# Patient Record
Sex: MALE | Race: WHITE | NOT HISPANIC OR LATINO | RURAL
[De-identification: names, ages, dates, MRNs, and addresses within clinical notes are randomized per-mention and may not be internally consistent; named-entity substitution may affect disease eponyms.]

---

## 2023-04-19 ENCOUNTER — OFFICE VISIT (OUTPATIENT)
Dept: FAMILY MEDICINE | Facility: CLINIC | Age: 68
End: 2023-04-19
Payer: COMMERCIAL

## 2023-04-19 VITALS
RESPIRATION RATE: 17 BRPM | TEMPERATURE: 98 F | HEIGHT: 69 IN | DIASTOLIC BLOOD PRESSURE: 85 MMHG | SYSTOLIC BLOOD PRESSURE: 122 MMHG | OXYGEN SATURATION: 96 % | HEART RATE: 81 BPM

## 2023-04-19 DIAGNOSIS — F17.219 CIGARETTE NICOTINE DEPENDENCE WITH NICOTINE-INDUCED DISORDER: Chronic | ICD-10-CM

## 2023-04-19 DIAGNOSIS — L03.116 CELLULITIS OF LEFT LOWER EXTREMITY: Primary | ICD-10-CM

## 2023-04-19 DIAGNOSIS — M79.89 LEFT LEG SWELLING: ICD-10-CM

## 2023-04-19 DIAGNOSIS — Z13.9 SCREENING DUE: ICD-10-CM

## 2023-04-19 DIAGNOSIS — Z13.220 ENCOUNTER FOR SCREENING FOR LIPID DISORDER: ICD-10-CM

## 2023-04-19 DIAGNOSIS — H93.13 TINNITUS OF BOTH EARS: ICD-10-CM

## 2023-04-19 DIAGNOSIS — I82.402 ACUTE DEEP VEIN THROMBOSIS (DVT) OF LEFT LOWER EXTREMITY, UNSPECIFIED VEIN: ICD-10-CM

## 2023-04-19 DIAGNOSIS — Z11.59 NEED FOR HEPATITIS C SCREENING TEST: ICD-10-CM

## 2023-04-19 PROBLEM — F17.210 DEPENDENCE ON NICOTINE FROM CIGARETTES: Chronic | Status: ACTIVE | Noted: 2023-04-19

## 2023-04-19 PROBLEM — F17.210 DEPENDENCE ON NICOTINE FROM CIGARETTES: Chronic | Status: RESOLVED | Noted: 2023-04-19 | Resolved: 2023-04-19

## 2023-04-19 PROCEDURE — 1126F PR PAIN SEVERITY QUANTIFIED, NO PAIN PRESENT: ICD-10-PCS | Mod: ,,, | Performed by: FAMILY MEDICINE

## 2023-04-19 PROCEDURE — 99203 OFFICE O/P NEW LOW 30 MIN: CPT | Mod: ,,, | Performed by: FAMILY MEDICINE

## 2023-04-19 PROCEDURE — 3074F SYST BP LT 130 MM HG: CPT | Mod: ,,, | Performed by: FAMILY MEDICINE

## 2023-04-19 PROCEDURE — 1101F PT FALLS ASSESS-DOCD LE1/YR: CPT | Mod: ,,, | Performed by: FAMILY MEDICINE

## 2023-04-19 PROCEDURE — 1101F PR PT FALLS ASSESS DOC 0-1 FALLS W/OUT INJ PAST YR: ICD-10-PCS | Mod: ,,, | Performed by: FAMILY MEDICINE

## 2023-04-19 PROCEDURE — 3079F DIAST BP 80-89 MM HG: CPT | Mod: ,,, | Performed by: FAMILY MEDICINE

## 2023-04-19 PROCEDURE — 3074F PR MOST RECENT SYSTOLIC BLOOD PRESSURE < 130 MM HG: ICD-10-PCS | Mod: ,,, | Performed by: FAMILY MEDICINE

## 2023-04-19 PROCEDURE — 3288F FALL RISK ASSESSMENT DOCD: CPT | Mod: ,,, | Performed by: FAMILY MEDICINE

## 2023-04-19 PROCEDURE — 1160F RVW MEDS BY RX/DR IN RCRD: CPT | Mod: ,,, | Performed by: FAMILY MEDICINE

## 2023-04-19 PROCEDURE — 1126F AMNT PAIN NOTED NONE PRSNT: CPT | Mod: ,,, | Performed by: FAMILY MEDICINE

## 2023-04-19 PROCEDURE — 1159F MED LIST DOCD IN RCRD: CPT | Mod: ,,, | Performed by: FAMILY MEDICINE

## 2023-04-19 PROCEDURE — 99203 PR OFFICE/OUTPT VISIT, NEW, LEVL III, 30-44 MIN: ICD-10-PCS | Mod: ,,, | Performed by: FAMILY MEDICINE

## 2023-04-19 PROCEDURE — 1160F PR REVIEW ALL MEDS BY PRESCRIBER/CLIN PHARMACIST DOCUMENTED: ICD-10-PCS | Mod: ,,, | Performed by: FAMILY MEDICINE

## 2023-04-19 PROCEDURE — 3079F PR MOST RECENT DIASTOLIC BLOOD PRESSURE 80-89 MM HG: ICD-10-PCS | Mod: ,,, | Performed by: FAMILY MEDICINE

## 2023-04-19 PROCEDURE — 1159F PR MEDICATION LIST DOCUMENTED IN MEDICAL RECORD: ICD-10-PCS | Mod: ,,, | Performed by: FAMILY MEDICINE

## 2023-04-19 PROCEDURE — 3288F PR FALLS RISK ASSESSMENT DOCUMENTED: ICD-10-PCS | Mod: ,,, | Performed by: FAMILY MEDICINE

## 2023-04-19 RX ORDER — SULFAMETHOXAZOLE AND TRIMETHOPRIM 800; 160 MG/1; MG/1
2 TABLET ORAL 2 TIMES DAILY
Qty: 28 TABLET | Refills: 0 | Status: SHIPPED | OUTPATIENT
Start: 2023-04-19 | End: 2023-04-26

## 2023-04-19 RX ORDER — TRIAMCINOLONE ACETONIDE 1 MG/G
CREAM TOPICAL 2 TIMES DAILY
Qty: 28.4 G | Refills: 1 | Status: SHIPPED | OUTPATIENT
Start: 2023-04-19 | End: 2023-08-10 | Stop reason: SDUPTHER

## 2023-04-19 NOTE — ASSESSMENT & PLAN NOTE
See media, photographs (4)  Bactrim double strength 2 tabs PO BID for 7 days  Kenalog 0.1% topical BID until symptoms subside (2-4 weeks), avoid open wounds.  Claritin OTC for itching PRN  RTC if symptoms are not getting better within 3-5 days.

## 2023-04-19 NOTE — ASSESSMENT & PLAN NOTE
Chronic for 20-30 years from previous work with high noises doing [employer]. No hearing loss.  Encouraged patient to research and get noise generator for tinnitus

## 2023-04-19 NOTE — PROGRESS NOTES
Subjective:       Patient ID: Jerome Miranda is a 67 y.o. male.    Chief Complaint: Rash (Per 2+ weeks ) and Leg Swelling (Left leg )    See update at bottom of note on 5/5/23    Patient is a 68yo  male with a history of 0.5 ppd for 30 years cigarette smoking who presents to [facility] for left lower leg rash and swelling. Patient last saw a provider about 40 years ago. About 4 weeks ago, patient's left lower leg started to become swollen. 2 weeks ago, patient started scratching his left ankle due to itches and later redness, swelling, scales developed and gradually got worse. Patient tried putting on vitamin E oil and lotion without relief. Patient endorses mild bleeding, blisters, and clear fluid drainage but denies pus draining. Patient denies known drug allergies or any other medical history including MI, PE, DVT, cancer, COPD, DM, or liver and kidney problems. See media for LLE photographs and A and P for more details.        Current Outpatient Medications:     sulfamethoxazole-trimethoprim 800-160mg (BACTRIM DS) 800-160 mg Tab, Take 2 tablets by mouth 2 (two) times daily. for 7 days, Disp: 28 tablet, Rfl: 0    triamcinolone acetonide 0.1% (KENALOG) 0.1 % cream, Apply topically 2 (two) times daily. Apply twice daily until symptoms subside (2-4 weeks), avoid open wounds., Disp: 28.4 g, Rfl: 1    Review of patient's allergies indicates:  No Known Allergies    History reviewed. No pertinent past medical history.    History reviewed. No pertinent surgical history.    History reviewed. No pertinent family history.    Social History     Tobacco Use    Smoking status: Every Day     Packs/day: 0.50     Years: 40.00     Pack years: 20.00     Types: Cigarettes    Smokeless tobacco: Never   Substance Use Topics    Alcohol use: Yes     Comment: 6/DRINKS    Drug use: Not Currently     Types: Marijuana       Review of Systems   Constitutional:  Negative for appetite change, chills, diaphoresis and fever.   HENT:  Negative  "for trouble swallowing.    Eyes:  Negative for visual disturbance.   Respiratory:  Negative for cough and shortness of breath.    Cardiovascular:  Positive for leg swelling (left with itch, mild bleed, drainage of clear fluid). Negative for chest pain.   Gastrointestinal:  Negative for abdominal pain, blood in stool, nausea and vomiting.   Genitourinary:  Negative for difficulty urinating, dysuria and hematuria.   Musculoskeletal:  Negative for leg pain.   Integumentary:  Positive for wound (left ankle) and mole/lesion (left ankle).   Neurological:  Negative for numbness.   Psychiatric/Behavioral:  Negative for sleep disturbance.        Current Medications:   Medication List with Changes/Refills   New Medications    SULFAMETHOXAZOLE-TRIMETHOPRIM 800-160MG (BACTRIM DS) 800-160 MG TAB    Take 2 tablets by mouth 2 (two) times daily. for 7 days       Start Date: 4/19/2023 End Date: 4/26/2023    TRIAMCINOLONE ACETONIDE 0.1% (KENALOG) 0.1 % CREAM    Apply topically 2 (two) times daily. Apply twice daily until symptoms subside (2-4 weeks), avoid open wounds.       Start Date: 4/19/2023 End Date: 5/19/2023            Objective:        Vitals:    04/19/23 1339   BP: 122/85   Pulse: 81   Resp: 17   Temp: 97.5 °F (36.4 °C)   TempSrc: Temporal   SpO2: 96%   Height: 5' 9" (1.753 m)       Physical Exam  Vitals and nursing note reviewed.   Constitutional:       General: He is not in acute distress.     Appearance: He is not ill-appearing, toxic-appearing or diaphoretic.   HENT:      Head: Normocephalic and atraumatic.      Right Ear: External ear normal.      Left Ear: External ear normal.      Mouth/Throat:      Pharynx: Oropharynx is clear.   Eyes:      General: No scleral icterus.        Right eye: No discharge.         Left eye: No discharge.      Extraocular Movements: Extraocular movements intact.      Pupils: Pupils are equal, round, and reactive to light.   Cardiovascular:      Rate and Rhythm: Normal rate and regular " rhythm.      Pulses: Normal pulses.      Heart sounds: Normal heart sounds. No murmur heard.  Pulmonary:      Effort: Pulmonary effort is normal. No respiratory distress.   Abdominal:      General: There is no distension.   Musculoskeletal:         General: Swelling present. No tenderness or deformity.      Cervical back: Neck supple.      Right lower leg: No edema.      Left lower leg: Edema (see media, 4 photographs) present.   Skin:     General: Skin is warm and dry.      Coloration: Skin is not jaundiced.      Findings: Erythema, lesion (left LE, see media, photographs) and rash present.   Neurological:      Mental Status: He is alert.      Sensory: No sensory deficit.      Gait: Gait normal.   Psychiatric:         Behavior: Behavior normal.             No results found for: WBC, HGB, HCT, PLT, CHOL, TRIG, HDL, LDLDIRECT, ALT, AST, NA, K, CL, CREATININE, BUN, CO2, TSH, PSA, INR, GLUF, HGBA1C, MICROALBUR   Assessment:       1. Cellulitis of left lower extremity    2. Encounter for screening for lipid disorder    3. Need for hepatitis C screening test    4. Acute deep vein thrombosis (DVT) of left lower extremity, unspecified vein    5. Cigarette nicotine dependence with nicotine-induced disorder    6. Left leg swelling    7. Screening due    8. Tinnitus of both ears        Plan:         Problem List Items Addressed This Visit          ENT    Tinnitus of both ears     Chronic for 20-30 years from previous work with high noises doing [employer]. No hearing loss.  Encouraged patient to research and get noise generator for tinnitus                ID    Cellulitis of left lower extremity - Primary     See media, photographs (4)  Bactrim double strength 2 tabs PO BID for 7 days  Kenalog 0.1% topical BID until symptoms subside (2-4 weeks), avoid open wounds.  Claritin OTC for itching PRN  RTC if symptoms are not getting better within 3-5 days.             Relevant Medications    sulfamethoxazole-trimethoprim 800-160mg  (BACTRIM DS) 800-160 mg Tab    triamcinolone acetonide 0.1% (KENALOG) 0.1 % cream       Hematology    RESOLVED: Acute deep vein thrombosis (DVT) of left lower extremity    Relevant Orders    US Lower Extremity Veins Left       Orthopedic    Left leg swelling     See media, photograph  Chronic 0.5ppd smoker for at least 30 years  Well's criteria for DVT = 0 (low risk)  Well's criteria for PE = 3 (moderate risk: 16.2% chance PE in an ED population)  US left LE to rule out DVT              Palliative Care    Screening due     Patient refused Hep C screen, lipid panel, COVID vaccine, Pneumococcal vac, Tetanus, colonoscopy, LDCT lung screen, shingles vac, AAA screen.  Consider addressing on next visit              Other    RESOLVED: Dependence on nicotine from cigarettes (Chronic)     Other Visit Diagnoses       Encounter for screening for lipid disorder        Need for hepatitis C screening test               Update on 5/5/23  I saw patient 2 weeks ago on 4/19/23 in clinic for his LLE cellulitis. Sent him home with Bactrim for 7 days and referred to ultrasound to rule out DVT since Well's score DVT was 0. Received a call from [employer] today saying patient has a left femoral vein DVT. I haven't seen patient in 2 weeks so I told [employer] to instruct patient to go to [facility] for DVT workup. I spoke with ED attending Dr. García about patient and he's aware. I called patient's cell phone but unavailable. Called his home phone and spoke with his son about it, and son said patient is at [facility].    Follow up if symptoms worsen or fail to improve.    Rolf Danielle DO     Instructed patient that if symptoms fail to improve or worsen patient should seek immediate medical attention or report to the nearest emergency department. Patient expressed verbal agreement and understanding to this plan of care.

## 2023-04-19 NOTE — ASSESSMENT & PLAN NOTE
See media, photograph  Chronic 0.5ppd smoker for at least 30 years  Well's criteria for DVT = 0 (low risk)  Well's criteria for PE = 3 (moderate risk: 16.2% chance PE in an ED population)  US left LE to rule out DVT

## 2023-04-19 NOTE — ASSESSMENT & PLAN NOTE
Patient refused Hep C screen, lipid panel, COVID vaccine, Pneumococcal vac, Tetanus, colonoscopy, LDCT lung screen, shingles vac, AAA screen.  Consider addressing on next visit

## 2023-05-05 ENCOUNTER — HOSPITAL ENCOUNTER (EMERGENCY)
Facility: HOSPITAL | Age: 68
Discharge: HOME | End: 2023-05-05
Payer: COMMERCIAL

## 2023-05-05 ENCOUNTER — HOSPITAL ENCOUNTER (OUTPATIENT)
Dept: RADIOLOGY | Facility: HOSPITAL | Age: 68
Discharge: HOME | End: 2023-05-05
Payer: COMMERCIAL

## 2023-05-05 VITALS
WEIGHT: 215 LBS | SYSTOLIC BLOOD PRESSURE: 119 MMHG | HEART RATE: 91 BPM | BODY MASS INDEX: 31.84 KG/M2 | OXYGEN SATURATION: 95 % | RESPIRATION RATE: 18 BRPM | DIASTOLIC BLOOD PRESSURE: 76 MMHG | TEMPERATURE: 98 F | HEIGHT: 69 IN

## 2023-05-05 DIAGNOSIS — I82.412 ACUTE DEEP VEIN THROMBOSIS (DVT) OF FEMORAL VEIN OF LEFT LOWER EXTREMITY: Primary | ICD-10-CM

## 2023-05-05 DIAGNOSIS — I82.402 ACUTE DEEP VEIN THROMBOSIS (DVT) OF LEFT LOWER EXTREMITY, UNSPECIFIED VEIN: ICD-10-CM

## 2023-05-05 LAB
ALBUMIN SERPL BCP-MCNC: 3.5 G/DL (ref 3.5–5)
ALBUMIN/GLOB SERPL: 0.8 {RATIO}
ALP SERPL-CCNC: 76 U/L (ref 45–115)
ALT SERPL W P-5'-P-CCNC: 42 U/L (ref 16–61)
ANION GAP SERPL CALCULATED.3IONS-SCNC: 15 MMOL/L (ref 7–16)
APTT PPP: 28.1 SECONDS (ref 25.2–37.3)
AST SERPL W P-5'-P-CCNC: 22 U/L (ref 15–37)
BASOPHILS # BLD AUTO: 0.07 K/UL (ref 0–0.2)
BASOPHILS NFR BLD AUTO: 0.8 % (ref 0–1)
BILIRUB SERPL-MCNC: 0.3 MG/DL (ref ?–1.2)
BUN SERPL-MCNC: 29 MG/DL (ref 7–18)
BUN/CREAT SERPL: 24 (ref 6–20)
CALCIUM SERPL-MCNC: 9.1 MG/DL (ref 8.5–10.1)
CHLORIDE SERPL-SCNC: 107 MMOL/L (ref 98–107)
CO2 SERPL-SCNC: 25 MMOL/L (ref 21–32)
CREAT SERPL-MCNC: 1.22 MG/DL (ref 0.7–1.3)
DIFFERENTIAL METHOD BLD: ABNORMAL
EGFR (NO RACE VARIABLE) (RUSH/TITUS): 65 ML/MIN/1.73M2
EOSINOPHIL # BLD AUTO: 0.41 K/UL (ref 0–0.5)
EOSINOPHIL NFR BLD AUTO: 4.7 % (ref 1–4)
ERYTHROCYTE [DISTWIDTH] IN BLOOD BY AUTOMATED COUNT: 14.5 % (ref 11.5–14.5)
GLOBULIN SER-MCNC: 4.4 G/DL (ref 2–4)
GLUCOSE SERPL-MCNC: 94 MG/DL (ref 74–106)
HCT VFR BLD AUTO: 49 % (ref 40–54)
HGB BLD-MCNC: 16.7 G/DL (ref 13.5–18)
IMM GRANULOCYTES # BLD AUTO: 0.03 K/UL (ref 0–0.04)
IMM GRANULOCYTES NFR BLD: 0.3 % (ref 0–0.4)
INR BLD: 1.07
LYMPHOCYTES # BLD AUTO: 2.28 K/UL (ref 1–4.8)
LYMPHOCYTES NFR BLD AUTO: 26.2 % (ref 27–41)
MCH RBC QN AUTO: 33.5 PG (ref 27–31)
MCHC RBC AUTO-ENTMCNC: 34.1 G/DL (ref 32–36)
MCV RBC AUTO: 98.2 FL (ref 80–96)
MONOCYTES # BLD AUTO: 0.8 K/UL (ref 0–0.8)
MONOCYTES NFR BLD AUTO: 9.2 % (ref 2–6)
MPC BLD CALC-MCNC: 8.9 FL (ref 9.4–12.4)
NEUTROPHILS # BLD AUTO: 5.12 K/UL (ref 1.8–7.7)
NEUTROPHILS NFR BLD AUTO: 58.8 % (ref 53–65)
NRBC # BLD AUTO: 0 X10E3/UL
NRBC, AUTO (.00): 0 %
PLATELET # BLD AUTO: 240 K/UL (ref 150–400)
POTASSIUM SERPL-SCNC: 4.4 MMOL/L (ref 3.5–5.1)
PROT SERPL-MCNC: 7.9 G/DL (ref 6.4–8.2)
PROTHROMBIN TIME: 13.5 SECONDS (ref 11.7–14.7)
RBC # BLD AUTO: 4.99 M/UL (ref 4.6–6.2)
SODIUM SERPL-SCNC: 143 MMOL/L (ref 136–145)
WBC # BLD AUTO: 8.71 K/UL (ref 4.5–11)

## 2023-05-05 PROCEDURE — 99284 PR EMERGENCY DEPT VISIT,LEVEL IV: ICD-10-PCS | Mod: ,,, | Performed by: NURSE PRACTITIONER

## 2023-05-05 PROCEDURE — 99283 EMERGENCY DEPT VISIT LOW MDM: CPT | Mod: 25

## 2023-05-05 PROCEDURE — 25000003 PHARM REV CODE 250: Performed by: NURSE PRACTITIONER

## 2023-05-05 PROCEDURE — 85025 COMPLETE CBC W/AUTO DIFF WBC: CPT | Performed by: NURSE PRACTITIONER

## 2023-05-05 PROCEDURE — 80053 COMPREHEN METABOLIC PANEL: CPT | Performed by: NURSE PRACTITIONER

## 2023-05-05 PROCEDURE — 93970 EXTREMITY STUDY: CPT | Mod: TC

## 2023-05-05 PROCEDURE — 99284 EMERGENCY DEPT VISIT MOD MDM: CPT | Mod: ,,, | Performed by: NURSE PRACTITIONER

## 2023-05-05 PROCEDURE — 85610 PROTHROMBIN TIME: CPT | Performed by: NURSE PRACTITIONER

## 2023-05-05 PROCEDURE — 93970 US LOWER EXTREMITY VEINS BILATERAL: ICD-10-PCS | Mod: 26,,, | Performed by: RADIOLOGY

## 2023-05-05 PROCEDURE — 93970 EXTREMITY STUDY: CPT | Mod: 26,,, | Performed by: RADIOLOGY

## 2023-05-05 PROCEDURE — 85730 THROMBOPLASTIN TIME PARTIAL: CPT | Performed by: NURSE PRACTITIONER

## 2023-05-05 RX ADMIN — APIXABAN 10 MG: 5 TABLET, FILM COATED ORAL at 02:05

## 2023-05-05 NOTE — ED NOTES
Pt requested more information on what was going to happen after given Eliquis. Nurse went and asked NP and NP said once the CMP resulted that she would discharge him as long as everything looked normal. Pt informed of NP statement and pt told nurse he was tired of being cooped up in a hole and needed to know where restroom was. Pt was shown restroom.

## 2023-05-05 NOTE — ED PROVIDER NOTES
Encounter Date: 5/5/2023       History     Chief Complaint   Patient presents with    Leg Pain     67-year-old male presents to ED with complaint of leg pain.  Patient states he has had issues with his left leg for approximately 1 month.  Patient endorses use of antibiotics for treatment of leg.  Patient states he went to PCP for left foot issue and told PCP he was having increased swelling to his left leg.  Patient was sent for ultrasound of left lower extremity and was noted to have DVT to left leg.  Patient sent to ED for further evaluation.  Patient denies chest pain, shortness of breath, weakness, dizziness, or gait disturbance.    The history is provided by the patient. No  was used.   Leg Pain   There was no injury mechanism. The incident occurred several weeks ago. The pain is present in the left leg. The pain is at a severity of 0/10. Pertinent negatives include no numbness, no inability to bear weight, no muscle weakness, no loss of sensation and no tingling. He reports no foreign bodies present. Nothing aggravates the symptoms. He has tried nothing for the symptoms. The treatment provided no relief.   Review of patient's allergies indicates:  No Known Allergies  History reviewed. No pertinent past medical history.  History reviewed. No pertinent surgical history.  History reviewed. No pertinent family history.  Social History     Tobacco Use    Smoking status: Every Day     Packs/day: 0.50     Years: 40.00     Pack years: 20.00     Types: Cigarettes    Smokeless tobacco: Never   Substance Use Topics    Alcohol use: Yes     Comment: 6/DRINKS    Drug use: Not Currently     Types: Marijuana     Review of Systems   Constitutional:  Negative for chills and fever.   HENT:  Negative for congestion, sinus pressure and sinus pain.    Eyes:  Negative for photophobia and visual disturbance.   Respiratory:  Negative for cough and shortness of breath.    Genitourinary:  Negative for dysuria and  urgency.   Musculoskeletal:  Negative for arthralgias and gait problem.   Skin:  Positive for color change. Negative for wound.   Neurological:  Negative for dizziness, tingling and numbness.   All other systems reviewed and are negative.    Physical Exam     Initial Vitals [05/05/23 1210]   BP Pulse Resp Temp SpO2   119/76 91 18 98.1 °F (36.7 °C) 95 %      MAP       --         Physical Exam    Nursing note and vitals reviewed.  Constitutional: He appears well-developed and well-nourished.   HENT:   Head: Normocephalic and atraumatic.   Eyes: EOM are normal. Pupils are equal, round, and reactive to light.   Neck: Neck supple.   Normal range of motion.  Cardiovascular:  Normal rate and regular rhythm.           No murmur heard.  Pulmonary/Chest: He has no wheezes. He has no rhonchi.   Abdominal: Abdomen is soft. He exhibits no distension. There is no abdominal tenderness.   Musculoskeletal:         General: Edema present. No tenderness.      Cervical back: Normal range of motion and neck supple.      Left lower leg: Swelling present. No tenderness.        Legs:      Lymphadenopathy:     He has no cervical adenopathy.   Neurological: He is alert and oriented to person, place, and time. No cranial nerve deficit or sensory deficit.   Skin: Skin is warm and dry. Capillary refill takes less than 2 seconds.   Psychiatric: He has a normal mood and affect. Thought content normal.       Medical Screening Exam   See Full Note    ED Course   Procedures  Labs Reviewed   COMPREHENSIVE METABOLIC PANEL - Abnormal; Notable for the following components:       Result Value    BUN 29 (*)     BUN/Creatinine Ratio 24 (*)     Globulin 4.4 (*)     All other components within normal limits   CBC WITH DIFFERENTIAL - Abnormal; Notable for the following components:    MCV 98.2 (*)     MCH 33.5 (*)     MPV 8.9 (*)     Lymphocytes % 26.2 (*)     Monocytes % 9.2 (*)     Eosinophils % 4.7 (*)     All other components within normal limits   APTT -  Normal   PROTIME-INR - Normal   CBC W/ AUTO DIFFERENTIAL    Narrative:     The following orders were created for panel order CBC auto differential.  Procedure                               Abnormality         Status                     ---------                               -----------         ------                     CBC with Differential[[id number]]        Abnormal            Final result                 Please view results for these tests on the individual orders.          Imaging Results    None          Medications   apixaban tablet 10 mg (10 mg Oral Given 5/5/23 1403)     Medical Decision Making:   History:   Old Records Summarized: records from clinic visits.       <> Summary of Records: Patient sent to imaging center for outpatient US; found to have Nonocclusive thrombus present in the left superficial femoral vein.  No other evidence of echogenic, non-compressible thrombus seen in the remaining veins of the extremities  Initial Assessment:   Leg pain  Differential Diagnosis:   DVT  ED Management:  [facility]    Patient presents for emergent evaluation of acute leg pain that poses a threat to life and/or bodily function.    In the ED patient found to have acute acute DVT of femoral vein left lower extremity.    I ordered labs and personally reviewed them.  Labs significant for no significant abnormalities    Discharge [facility]  Patient was managed in the ED with PO eliquis.    The response to treatment was good.    Patient was discharged in stable condition.  Detailed return precautions discussed.                         Clinical Impression:   Final diagnoses:  [I82.412] Acute deep vein thrombosis (DVT) of femoral vein of left lower extremity (Primary)        ED Disposition Condition    Discharge Stable          ED Prescriptions       Medication Sig Dispense Start Date End Date Auth. Provider    apixaban (ELIQUIS) 5 mg Tab Take 2 tablets (10 mg total) by mouth 2 (two) times daily for 7 days, THEN 1 tablet (5 mg total)  2 (two) times daily. 208 tablet 5/5/2023 8/10/2023 AUGUSTA Chen          Follow-up Information    None          AUGUSTA Chen  05/09/23 3701

## 2023-07-24 PROBLEM — Z13.9 SCREENING DUE: Status: RESOLVED | Noted: 2023-04-19 | Resolved: 2023-07-24

## 2023-08-10 ENCOUNTER — OFFICE VISIT (OUTPATIENT)
Dept: FAMILY MEDICINE | Facility: CLINIC | Age: 68
End: 2023-08-10
Payer: COMMERCIAL

## 2023-08-10 VITALS
RESPIRATION RATE: 19 BRPM | HEIGHT: 69 IN | OXYGEN SATURATION: 96 % | HEART RATE: 96 BPM | TEMPERATURE: 98 F | SYSTOLIC BLOOD PRESSURE: 126 MMHG | WEIGHT: 229 LBS | BODY MASS INDEX: 33.92 KG/M2 | DIASTOLIC BLOOD PRESSURE: 80 MMHG

## 2023-08-10 DIAGNOSIS — Z53.20 COLON CANCER SCREENING DECLINED: ICD-10-CM

## 2023-08-10 DIAGNOSIS — Z53.20 LUNG CANCER SCREENING DECLINED BY PATIENT: ICD-10-CM

## 2023-08-10 DIAGNOSIS — Z72.0 DECLINED SMOKING CESSATION: Chronic | ICD-10-CM

## 2023-08-10 DIAGNOSIS — R21 RASH, SKIN: ICD-10-CM

## 2023-08-10 DIAGNOSIS — M79.89 LEFT LEG SWELLING: ICD-10-CM

## 2023-08-10 DIAGNOSIS — Z13.6 SCREENING FOR AAA (ABDOMINAL AORTIC ANEURYSM): ICD-10-CM

## 2023-08-10 DIAGNOSIS — Z53.20: ICD-10-CM

## 2023-08-10 DIAGNOSIS — Z53.20 SCREENING FOR HEPATITIS C DECLINED: ICD-10-CM

## 2023-08-10 DIAGNOSIS — Z28.21 NO VACCINATION-PT REFUSE: Chronic | ICD-10-CM

## 2023-08-10 DIAGNOSIS — L03.116 CELLULITIS OF LEFT LOWER EXTREMITY: Primary | ICD-10-CM

## 2023-08-10 PROCEDURE — 3079F PR MOST RECENT DIASTOLIC BLOOD PRESSURE 80-89 MM HG: ICD-10-PCS | Mod: ,,, | Performed by: FAMILY MEDICINE

## 2023-08-10 PROCEDURE — 99214 PR OFFICE/OUTPT VISIT, EST, LEVL IV, 30-39 MIN: ICD-10-PCS | Mod: ,,, | Performed by: FAMILY MEDICINE

## 2023-08-10 PROCEDURE — 3008F PR BODY MASS INDEX (BMI) DOCUMENTED: ICD-10-PCS | Mod: ,,, | Performed by: FAMILY MEDICINE

## 2023-08-10 PROCEDURE — 99214 OFFICE O/P EST MOD 30 MIN: CPT | Mod: ,,, | Performed by: FAMILY MEDICINE

## 2023-08-10 PROCEDURE — 1159F PR MEDICATION LIST DOCUMENTED IN MEDICAL RECORD: ICD-10-PCS | Mod: ,,, | Performed by: FAMILY MEDICINE

## 2023-08-10 PROCEDURE — 1101F PR PT FALLS ASSESS DOC 0-1 FALLS W/OUT INJ PAST YR: ICD-10-PCS | Mod: ,,, | Performed by: FAMILY MEDICINE

## 2023-08-10 PROCEDURE — 3008F BODY MASS INDEX DOCD: CPT | Mod: ,,, | Performed by: FAMILY MEDICINE

## 2023-08-10 PROCEDURE — 3079F DIAST BP 80-89 MM HG: CPT | Mod: ,,, | Performed by: FAMILY MEDICINE

## 2023-08-10 PROCEDURE — 3074F PR MOST RECENT SYSTOLIC BLOOD PRESSURE < 130 MM HG: ICD-10-PCS | Mod: ,,, | Performed by: FAMILY MEDICINE

## 2023-08-10 PROCEDURE — 3288F PR FALLS RISK ASSESSMENT DOCUMENTED: ICD-10-PCS | Mod: ,,, | Performed by: FAMILY MEDICINE

## 2023-08-10 PROCEDURE — 1159F MED LIST DOCD IN RCRD: CPT | Mod: ,,, | Performed by: FAMILY MEDICINE

## 2023-08-10 PROCEDURE — 1101F PT FALLS ASSESS-DOCD LE1/YR: CPT | Mod: ,,, | Performed by: FAMILY MEDICINE

## 2023-08-10 PROCEDURE — 3074F SYST BP LT 130 MM HG: CPT | Mod: ,,, | Performed by: FAMILY MEDICINE

## 2023-08-10 PROCEDURE — 3288F FALL RISK ASSESSMENT DOCD: CPT | Mod: ,,, | Performed by: FAMILY MEDICINE

## 2023-08-10 RX ORDER — TRIAMCINOLONE ACETONIDE 1 MG/G
CREAM TOPICAL 2 TIMES DAILY
Qty: 45 G | Refills: 1 | Status: SHIPPED | OUTPATIENT
Start: 2023-08-10 | End: 2023-09-09

## 2023-08-10 RX ORDER — FUROSEMIDE 20 MG/1
20 TABLET ORAL
Qty: 30 TABLET | Refills: 4 | Status: SHIPPED | OUTPATIENT
Start: 2023-08-11 | End: 2024-08-10

## 2023-08-10 RX ORDER — SULFAMETHOXAZOLE AND TRIMETHOPRIM 800; 160 MG/1; MG/1
2 TABLET ORAL 2 TIMES DAILY
Qty: 28 TABLET | Refills: 0 | Status: SHIPPED | OUTPATIENT
Start: 2023-08-10 | End: 2023-08-17

## 2023-08-10 NOTE — ASSESSMENT & PLAN NOTE
Smokes 0.25-0.5 ppd for 40 years  Assistance with smoking cessation was offered, including:  []  Medications  [x]  Counseling  [x]  Printed Information on Smoking Cessation  [x]  Referral to a Smoking Cessation Program    Patient was counseled regarding smoking for 3-10 minutes.    Risks/benefits discussed. Refused cessation program

## 2023-08-10 NOTE — PROGRESS NOTES
"Subjective:       Patient ID: Jerome Miranda is a 68 y.o. male.    Chief Complaint: Rash (LEFT FOOT/ANKLE X 10 DAYS )    Patient is a 67yo chronic smoker with a history of LLE cellulitis, swelling, and DVT on Eliquis who presents to [facility] with left foot and ankle rash for 10 days. Patient reports symptoms started about 10 days ago and they got worse. He endorses swelling, redness, itching, mild pain described as "stung" this morning, pus and blood oozing. He has a history of a similar episode 4 months ago seen by me and symptoms improved with Bactrim and topical Kenalog. Of note, U/S LLE was ordered to ruleout DVT and it came back positive in left superficial femoral vein on 5/5/23. Patient treated with Eliquis for 3 months and finishing this week. He reports at least one other episode of the rashes in LLE that resolved spontaneously but not this time. He reports mild rashes described as "bumps" on bilateral upper forearms for the past 2 days but said his wife started making new soaps and symptoms started after he used them. He hasn't taken any medications for symptoms. Denies f/c/cp/sob/n/v, appetite, urinary or bowel habit changes.          Current Outpatient Medications:     apixaban (ELIQUIS) 5 mg Tab, Take 2 tablets (10 mg total) by mouth 2 (two) times daily for 7 days, THEN 1 tablet (5 mg total) 2 (two) times daily., Disp: 208 tablet, Rfl: 0    [START ON 8/11/2023] furosemide (LASIX) 20 MG tablet, Take 1 tablet (20 mg total) by mouth 3 (three) times a week., Disp: 30 tablet, Rfl: 4    sulfamethoxazole-trimethoprim 800-160mg (BACTRIM DS) 800-160 mg Tab, Take 2 tablets by mouth 2 (two) times daily. for 7 days, Disp: 28 tablet, Rfl: 0    triamcinolone acetonide 0.1% (KENALOG) 0.1 % cream, Apply topically 2 (two) times daily. Apply twice daily until symptoms subside (2-4 weeks), avoid open wounds., Disp: 45 g, Rfl: 1    Review of patient's allergies indicates:  No Known Allergies    History reviewed. No pertinent " past medical history.    History reviewed. No pertinent surgical history.    History reviewed. No pertinent family history.    Social History     Tobacco Use    Smoking status: Every Day     Current packs/day: 0.50     Average packs/day: 0.5 packs/day for 40.0 years (20.0 ttl pk-yrs)     Types: Cigarettes    Smokeless tobacco: Never   Substance Use Topics    Alcohol use: Yes     Comment: 6/DRINKS    Drug use: Not Currently     Types: Marijuana       Review of Systems   Constitutional:  Negative for appetite change, chills, diaphoresis and fever.   HENT:  Negative for trouble swallowing.    Eyes:  Negative for visual disturbance.   Respiratory:  Negative for shortness of breath.    Cardiovascular:  Positive for leg swelling (left with itch, mild bleed, drainage of clear fluid). Negative for chest pain.   Gastrointestinal:  Negative for abdominal pain, blood in stool, nausea and vomiting.   Genitourinary:  Negative for difficulty urinating and dysuria.   Musculoskeletal:  Negative for gait problem.        Left foot pain   Integumentary:  Positive for wound (left ankle) and mole/lesion (left ankle).   Neurological:  Negative for numbness.   Psychiatric/Behavioral:  Negative for sleep disturbance.          Current Medications:   Medication List with Changes/Refills   New Medications    FUROSEMIDE (LASIX) 20 MG TABLET    Take 1 tablet (20 mg total) by mouth 3 (three) times a week.       Start Date: 8/11/2023 End Date: 8/10/2024    SULFAMETHOXAZOLE-TRIMETHOPRIM 800-160MG (BACTRIM DS) 800-160 MG TAB    Take 2 tablets by mouth 2 (two) times daily. for 7 days       Start Date: 8/10/2023 End Date: 8/17/2023   Current Medications    APIXABAN (ELIQUIS) 5 MG TAB    Take 2 tablets (10 mg total) by mouth 2 (two) times daily for 7 days, THEN 1 tablet (5 mg total) 2 (two) times daily.       Start Date: 5/5/2023  End Date: 8/10/2023   Changed and/or Refilled Medications    Modified Medication Previous Medication    TRIAMCINOLONE  "ACETONIDE 0.1% (KENALOG) 0.1 % CREAM triamcinolone acetonide 0.1% (KENALOG) 0.1 % cream       Apply topically 2 (two) times daily. Apply twice daily until symptoms subside (2-4 weeks), avoid open wounds.    Apply topically 2 (two) times daily. Apply twice daily until symptoms subside (2-4 weeks), avoid open wounds.       Start Date: 8/10/2023 End Date: 9/9/2023    Start Date: 4/19/2023 End Date: 8/10/2023            Objective:        Vitals:    08/10/23 1004   BP: 126/80   Pulse: 96   Resp: 19   Temp: 98.1 °F (36.7 °C)   TempSrc: Temporal   SpO2: 96%   Weight: 103.9 kg (229 lb)   Height: 5' 9" (1.753 m)       Physical Exam  Vitals and nursing note reviewed.   Constitutional:       General: He is not in acute distress.     Appearance: He is not ill-appearing, toxic-appearing or diaphoretic.   HENT:      Head: Normocephalic and atraumatic.      Right Ear: External ear normal.      Left Ear: External ear normal.      Nose: Nose normal.      Mouth/Throat:      Mouth: Mucous membranes are dry.      Pharynx: Oropharynx is clear.   Eyes:      General: No scleral icterus.        Right eye: No discharge.         Left eye: No discharge.      Extraocular Movements: Extraocular movements intact.      Pupils: Pupils are equal, round, and reactive to light.   Cardiovascular:      Rate and Rhythm: Normal rate and regular rhythm.      Pulses: Normal pulses.      Heart sounds: Normal heart sounds. No murmur heard.  Pulmonary:      Effort: Pulmonary effort is normal. No respiratory distress.   Abdominal:      General: There is no distension.   Musculoskeletal:         General: Swelling present. No tenderness or deformity.      Cervical back: Neck supple.      Right lower leg: No edema.      Left lower leg: Edema (see media, 2 photographs) present.        Feet:    Feet:      Left foot:      Skin integrity: Ulcer and erythema present.   Skin:     General: Skin is warm and dry.      Coloration: Skin is not jaundiced.      Findings: " Erythema, lesion (left LE, see media, photographs) and rash present.   Neurological:      Mental Status: He is alert and oriented to person, place, and time.      Sensory: No sensory deficit.      Gait: Gait normal.   Psychiatric:         Mood and Affect: Mood normal.         Behavior: Behavior normal.               Lab Results   Component Value Date    WBC 8.71 05/05/2023    HGB 16.7 05/05/2023    HCT 49.0 05/05/2023     05/05/2023    ALT 42 05/05/2023    AST 22 05/05/2023     05/05/2023    K 4.4 05/05/2023     05/05/2023    CREATININE 1.22 05/05/2023    BUN 29 (H) 05/05/2023    CO2 25 05/05/2023    INR 1.07 05/05/2023      Assessment:       1. Cellulitis of left lower extremity    2. Left leg swelling    3. No vaccination-pt refuse    4. Declined smoking cessation    5. Screening for hepatitis C declined    6. Hyperlipidemia screening test declined    7. Lung cancer screening declined by patient    8. Screening for AAA (abdominal aortic aneurysm)    9. Hemoglobin A1c test declined    10. Colon cancer screening declined    11. Rash, skin        Plan:         Problem List Items Addressed This Visit          Derm    Rash, skin     Upper extremities. Avoid new soap. OTC claritin PRN and f/u 3 weeks            Cardiac/Vascular    Screening for AAA (abdominal aortic aneurysm)     Discussed risks/benefits, refused today.            ID    No vaccination-pt refuse (Chronic)     Discussed risks/benefits, pt refused PCV, tetanus, shingles vaccines         Cellulitis of left lower extremity - Primary     Hx of similar symptoms 4 months ago resolved with Bactrim and Kenalog topical  see media, photographs (2)  Bactrim double strength 2 tabs PO BID for 7 days  Kenalog 0.1% topical BID until symptoms subside (2-4 weeks), avoid open wounds.  Lasix 20mg 1 tablet 3 times weekly  Claritin OTC for itching PRN  F/u 3 weeks, sooner if not getting better in 3-5 days         Relevant Medications     sulfamethoxazole-trimethoprim 800-160mg (BACTRIM DS) 800-160 mg Tab    triamcinolone acetonide 0.1% (KENALOG) 0.1 % cream    furosemide (LASIX) 20 MG tablet (Start on 8/11/2023)       Orthopedic    Left leg swelling     History of DVT on 5/5/23, has been compliant with Eliquis and finishing this week.  See LLE cellulitis         Relevant Medications    furosemide (LASIX) 20 MG tablet (Start on 8/11/2023)       Palliative Care    Screening for hepatitis C declined     Discussed risks/benefits, refused today.         Hyperlipidemia screening test declined     Discussed risks/benefits, refused lipid panel today.         Lung cancer screening declined by patient     Discussed risks/benefits, refused low dose CT lung today.         Hemoglobin A1c test declined     Discussed risks/benefits, refused DM2/A1c screen today.         Colon cancer screening declined     Discussed risks/benefits, refused colonoscopy screen today.            Other    Declined smoking cessation (Chronic)     Smokes 0.25-0.5 ppd for 40 years  Assistance with smoking cessation was offered, including:  []  Medications  [x]  Counseling  [x]  Printed Information on Smoking Cessation  [x]  Referral to a Smoking Cessation Program    Patient was counseled regarding smoking for 3-10 minutes.    Risks/benefits discussed. Refused cessation program              Follow up in about 3 weeks (around 8/31/2023) for left leg cellulitis.    Rolf Danielle DO     Instructed patient that if symptoms fail to improve or worsen patient should seek immediate medical attention or report to the nearest emergency department. Patient expressed verbal agreement and understanding to this plan of care.

## 2023-08-10 NOTE — ASSESSMENT & PLAN NOTE
History of DVT on 5/5/23, has been compliant with Eliquis and finishing this week.  See LLE cellulitis

## 2023-08-10 NOTE — ASSESSMENT & PLAN NOTE
Hx of similar symptoms 4 months ago resolved with Bactrim and Kenalog topical  see media, photographs (2)  Bactrim double strength 2 tabs PO BID for 7 days  Kenalog 0.1% topical BID until symptoms subside (2-4 weeks), avoid open wounds.  Lasix 20mg 1 tablet 3 times weekly  Claritin OTC for itching PRN  F/u 3 weeks, sooner if not getting better in 3-5 days

## 2023-09-14 ENCOUNTER — OFFICE VISIT (OUTPATIENT)
Dept: FAMILY MEDICINE | Facility: CLINIC | Age: 68
End: 2023-09-14
Payer: COMMERCIAL

## 2023-09-14 VITALS
OXYGEN SATURATION: 96 % | SYSTOLIC BLOOD PRESSURE: 129 MMHG | HEART RATE: 79 BPM | HEIGHT: 69 IN | TEMPERATURE: 98 F | DIASTOLIC BLOOD PRESSURE: 80 MMHG | BODY MASS INDEX: 33.92 KG/M2 | WEIGHT: 229 LBS

## 2023-09-14 DIAGNOSIS — E66.9 OBESITY (BMI 30.0-34.9): ICD-10-CM

## 2023-09-14 DIAGNOSIS — L03.116 CELLULITIS OF LEFT LOWER EXTREMITY: ICD-10-CM

## 2023-09-14 DIAGNOSIS — Z11.59 NEED FOR HEPATITIS C SCREENING TEST: ICD-10-CM

## 2023-09-14 DIAGNOSIS — Z13.9 SCREENING DUE: ICD-10-CM

## 2023-09-14 DIAGNOSIS — Z72.0 DECLINED SMOKING CESSATION: Chronic | ICD-10-CM

## 2023-09-14 DIAGNOSIS — Z53.20 COLON CANCER SCREENING DECLINED: ICD-10-CM

## 2023-09-14 DIAGNOSIS — R54 FRAILTY SYNDROME IN GERIATRIC PATIENT: ICD-10-CM

## 2023-09-14 DIAGNOSIS — E66.9 CLASS 1 OBESITY WITH BODY MASS INDEX (BMI) OF 33.0 TO 33.9 IN ADULT, UNSPECIFIED OBESITY TYPE, UNSPECIFIED WHETHER SERIOUS COMORBIDITY PRESENT: ICD-10-CM

## 2023-09-14 DIAGNOSIS — Z13.220 SCREENING CHOLESTEROL LEVEL: ICD-10-CM

## 2023-09-14 DIAGNOSIS — Z13.1 DIABETES MELLITUS SCREENING: ICD-10-CM

## 2023-09-14 DIAGNOSIS — M79.89 LEFT LEG SWELLING: Primary | ICD-10-CM

## 2023-09-14 DIAGNOSIS — Z28.21 NO VACCINATION-PT REFUSE: Chronic | ICD-10-CM

## 2023-09-14 DIAGNOSIS — Z13.6 SCREENING FOR AAA (ABDOMINAL AORTIC ANEURYSM): ICD-10-CM

## 2023-09-14 LAB
CHOLEST SERPL-MCNC: 203 MG/DL (ref 0–200)
CHOLEST/HDLC SERPL: 4.5 {RATIO}
EST. AVERAGE GLUCOSE BLD GHB EST-MCNC: 87 MG/DL
HBA1C MFR BLD HPLC: 5.2 % (ref 4.5–6.6)
HCV AB SER QL: NORMAL
HDLC SERPL-MCNC: 45 MG/DL (ref 40–60)
LDLC SERPL CALC-MCNC: 128 MG/DL
LDLC/HDLC SERPL: 2.8 {RATIO}
NONHDLC SERPL-MCNC: 158 MG/DL
TRIGL SERPL-MCNC: 151 MG/DL (ref 35–150)
VLDLC SERPL-MCNC: 30 MG/DL

## 2023-09-14 PROCEDURE — 80061 LIPID PANEL: ICD-10-PCS | Mod: ,,, | Performed by: CLINICAL MEDICAL LABORATORY

## 2023-09-14 PROCEDURE — 1160F RVW MEDS BY RX/DR IN RCRD: CPT | Mod: ,,, | Performed by: FAMILY MEDICINE

## 2023-09-14 PROCEDURE — 1101F PR PT FALLS ASSESS DOC 0-1 FALLS W/OUT INJ PAST YR: ICD-10-PCS | Mod: ,,, | Performed by: FAMILY MEDICINE

## 2023-09-14 PROCEDURE — 3288F FALL RISK ASSESSMENT DOCD: CPT | Mod: ,,, | Performed by: FAMILY MEDICINE

## 2023-09-14 PROCEDURE — 3079F DIAST BP 80-89 MM HG: CPT | Mod: ,,, | Performed by: FAMILY MEDICINE

## 2023-09-14 PROCEDURE — 3074F SYST BP LT 130 MM HG: CPT | Mod: ,,, | Performed by: FAMILY MEDICINE

## 2023-09-14 PROCEDURE — 86803 HEPATITIS C AB TEST: CPT | Mod: ,,, | Performed by: CLINICAL MEDICAL LABORATORY

## 2023-09-14 PROCEDURE — 86803 HEPATITIS C ANTIBODY: ICD-10-PCS | Mod: ,,, | Performed by: CLINICAL MEDICAL LABORATORY

## 2023-09-14 PROCEDURE — 3008F PR BODY MASS INDEX (BMI) DOCUMENTED: ICD-10-PCS | Mod: ,,, | Performed by: FAMILY MEDICINE

## 2023-09-14 PROCEDURE — 3074F PR MOST RECENT SYSTOLIC BLOOD PRESSURE < 130 MM HG: ICD-10-PCS | Mod: ,,, | Performed by: FAMILY MEDICINE

## 2023-09-14 PROCEDURE — 99214 PR OFFICE/OUTPT VISIT, EST, LEVL IV, 30-39 MIN: ICD-10-PCS | Mod: ,,, | Performed by: FAMILY MEDICINE

## 2023-09-14 PROCEDURE — 3044F HG A1C LEVEL LT 7.0%: CPT | Mod: ,,, | Performed by: FAMILY MEDICINE

## 2023-09-14 PROCEDURE — 80061 LIPID PANEL: CPT | Mod: ,,, | Performed by: CLINICAL MEDICAL LABORATORY

## 2023-09-14 PROCEDURE — 1159F MED LIST DOCD IN RCRD: CPT | Mod: ,,, | Performed by: FAMILY MEDICINE

## 2023-09-14 PROCEDURE — 3288F PR FALLS RISK ASSESSMENT DOCUMENTED: ICD-10-PCS | Mod: ,,, | Performed by: FAMILY MEDICINE

## 2023-09-14 PROCEDURE — 3008F BODY MASS INDEX DOCD: CPT | Mod: ,,, | Performed by: FAMILY MEDICINE

## 2023-09-14 PROCEDURE — 3044F PR MOST RECENT HEMOGLOBIN A1C LEVEL <7.0%: ICD-10-PCS | Mod: ,,, | Performed by: FAMILY MEDICINE

## 2023-09-14 PROCEDURE — 1159F PR MEDICATION LIST DOCUMENTED IN MEDICAL RECORD: ICD-10-PCS | Mod: ,,, | Performed by: FAMILY MEDICINE

## 2023-09-14 PROCEDURE — 99214 OFFICE O/P EST MOD 30 MIN: CPT | Mod: ,,, | Performed by: FAMILY MEDICINE

## 2023-09-14 PROCEDURE — 83036 HEMOGLOBIN GLYCOSYLATED A1C: CPT | Mod: GZ,,, | Performed by: CLINICAL MEDICAL LABORATORY

## 2023-09-14 PROCEDURE — 83036 HEMOGLOBIN A1C: ICD-10-PCS | Mod: GZ,,, | Performed by: CLINICAL MEDICAL LABORATORY

## 2023-09-14 PROCEDURE — 1101F PT FALLS ASSESS-DOCD LE1/YR: CPT | Mod: ,,, | Performed by: FAMILY MEDICINE

## 2023-09-14 PROCEDURE — 3079F PR MOST RECENT DIASTOLIC BLOOD PRESSURE 80-89 MM HG: ICD-10-PCS | Mod: ,,, | Performed by: FAMILY MEDICINE

## 2023-09-14 PROCEDURE — 1160F PR REVIEW ALL MEDS BY PRESCRIBER/CLIN PHARMACIST DOCUMENTED: ICD-10-PCS | Mod: ,,, | Performed by: FAMILY MEDICINE

## 2023-09-22 ENCOUNTER — OFFICE VISIT (OUTPATIENT)
Dept: FAMILY MEDICINE | Facility: CLINIC | Age: 68
End: 2023-09-22
Payer: COMMERCIAL

## 2023-09-22 VITALS
HEART RATE: 94 BPM | BODY MASS INDEX: 33.92 KG/M2 | HEIGHT: 69 IN | OXYGEN SATURATION: 96 % | SYSTOLIC BLOOD PRESSURE: 118 MMHG | DIASTOLIC BLOOD PRESSURE: 83 MMHG | TEMPERATURE: 98 F | WEIGHT: 229 LBS

## 2023-09-22 DIAGNOSIS — Z53.20 LUNG CANCER SCREENING DECLINED BY PATIENT: ICD-10-CM

## 2023-09-22 DIAGNOSIS — R21 RASH, SKIN: ICD-10-CM

## 2023-09-22 DIAGNOSIS — M79.89 LEFT LEG SWELLING: ICD-10-CM

## 2023-09-22 DIAGNOSIS — L25.9 CONTACT DERMATITIS, UNSPECIFIED CONTACT DERMATITIS TYPE, UNSPECIFIED TRIGGER: ICD-10-CM

## 2023-09-22 DIAGNOSIS — L03.116 CELLULITIS OF LEFT LOWER EXTREMITY: ICD-10-CM

## 2023-09-22 DIAGNOSIS — Z53.20 COLON CANCER SCREENING DECLINED: ICD-10-CM

## 2023-09-22 PROCEDURE — 3074F PR MOST RECENT SYSTOLIC BLOOD PRESSURE < 130 MM HG: ICD-10-PCS | Mod: ,,, | Performed by: FAMILY MEDICINE

## 2023-09-22 PROCEDURE — 3079F DIAST BP 80-89 MM HG: CPT | Mod: ,,, | Performed by: FAMILY MEDICINE

## 2023-09-22 PROCEDURE — 99213 OFFICE O/P EST LOW 20 MIN: CPT | Mod: ,,, | Performed by: FAMILY MEDICINE

## 2023-09-22 PROCEDURE — 3044F HG A1C LEVEL LT 7.0%: CPT | Mod: ,,, | Performed by: FAMILY MEDICINE

## 2023-09-22 PROCEDURE — 1101F PT FALLS ASSESS-DOCD LE1/YR: CPT | Mod: ,,, | Performed by: FAMILY MEDICINE

## 2023-09-22 PROCEDURE — 3288F PR FALLS RISK ASSESSMENT DOCUMENTED: ICD-10-PCS | Mod: ,,, | Performed by: FAMILY MEDICINE

## 2023-09-22 PROCEDURE — 3008F BODY MASS INDEX DOCD: CPT | Mod: ,,, | Performed by: FAMILY MEDICINE

## 2023-09-22 PROCEDURE — 99213 PR OFFICE/OUTPT VISIT, EST, LEVL III, 20-29 MIN: ICD-10-PCS | Mod: ,,, | Performed by: FAMILY MEDICINE

## 2023-09-22 PROCEDURE — 1159F PR MEDICATION LIST DOCUMENTED IN MEDICAL RECORD: ICD-10-PCS | Mod: ,,, | Performed by: FAMILY MEDICINE

## 2023-09-22 PROCEDURE — 3079F PR MOST RECENT DIASTOLIC BLOOD PRESSURE 80-89 MM HG: ICD-10-PCS | Mod: ,,, | Performed by: FAMILY MEDICINE

## 2023-09-22 PROCEDURE — 3288F FALL RISK ASSESSMENT DOCD: CPT | Mod: ,,, | Performed by: FAMILY MEDICINE

## 2023-09-22 PROCEDURE — 3044F PR MOST RECENT HEMOGLOBIN A1C LEVEL <7.0%: ICD-10-PCS | Mod: ,,, | Performed by: FAMILY MEDICINE

## 2023-09-22 PROCEDURE — 1101F PR PT FALLS ASSESS DOC 0-1 FALLS W/OUT INJ PAST YR: ICD-10-PCS | Mod: ,,, | Performed by: FAMILY MEDICINE

## 2023-09-22 PROCEDURE — 3008F PR BODY MASS INDEX (BMI) DOCUMENTED: ICD-10-PCS | Mod: ,,, | Performed by: FAMILY MEDICINE

## 2023-09-22 PROCEDURE — 3074F SYST BP LT 130 MM HG: CPT | Mod: ,,, | Performed by: FAMILY MEDICINE

## 2023-09-22 PROCEDURE — 1159F MED LIST DOCD IN RCRD: CPT | Mod: ,,, | Performed by: FAMILY MEDICINE

## 2023-09-22 RX ORDER — LORATADINE PSEUDOEPHEDRINE SULFATE 10; 240 MG/1; MG/1
1 TABLET, EXTENDED RELEASE ORAL DAILY
Qty: 10 TABLET | Refills: 0 | COMMUNITY
Start: 2023-09-22 | End: 2023-10-02

## 2023-09-22 RX ORDER — TRIAMCINOLONE ACETONIDE 1 MG/G
CREAM TOPICAL 2 TIMES DAILY
Qty: 1 G | Refills: 0 | Status: SHIPPED | OUTPATIENT
Start: 2023-09-22 | End: 2023-10-20

## 2023-09-22 RX ORDER — METHYLPREDNISOLONE 4 MG/1
4 TABLET ORAL DAILY
Qty: 10 TABLET | Refills: 0 | Status: SHIPPED | OUTPATIENT
Start: 2023-09-22 | End: 2023-10-02

## 2023-09-23 NOTE — ASSESSMENT & PLAN NOTE
Patient presenting with cellulitis/rash that has previously been treated with Bactrim and Kenalog. Responds to treatment without complete resolution. Endorses two prior episodes following working in the yard. Notes he wears the same closes each time. Noted to be on both right and left heel and left wrist. See media. Possibly contact dermatitis secondary to poison ivy or some other irritant.     - Steroids IM, patient declined  - Medrol 4mg po qd x 5-7 days  - Claritin otc prn for itching  - F/U in 3 days to assess response

## 2023-09-23 NOTE — ASSESSMENT & PLAN NOTE
Hx of similar symptom 5 month ago. Resolved with Bactrim and Kenalog topical. Today has returned with similar cellulitis on right and left ankle, as well as, left wrist.   - Kenalog 0.1% topical bid for next 2-4 weeks  - Claritin otc prn for itching   - Lasix 20 mg po 3/wk  - F/U as needed

## 2023-09-23 NOTE — ASSESSMENT & PLAN NOTE
H/O DVT on 5/523 treated with Eliquis. Currently on Lasix 20 mg po 3/wk  - Continue lasix as prescribed

## 2023-09-23 NOTE — PROGRESS NOTES
Subjective:       Patient ID: Jerome Miranda is a 68 y.o. male.    Chief Complaint: Rash (Both ankles and left wrist )    Mr. Miranda is a 67 yo male with a PMH of LLE cellulitis, bilateral LE swelling on Lasix and DVT treated with Eliquis presenting today with a similar complaint as last visit to [facility]. Previously treated with Bactrim, Kenalog, and Claritin. States his cellulitis/rash responded to the treatment. However, he did not achieve complete resolution. Of note, this is his third episode (today, 1 month and 5 months ago). States that on Sunday he worked in his yard. The following day the rash and itching returned. This time it appeared on both his left and right heel. Yesterday he noted a similar rash on his right wrist. He endorses wearing the same clothes each time he goes yard work. He state that he also has seasonal allergies, however, denies any asthma, chest pain sob, or wheezing.           Current Outpatient Medications:     furosemide (LASIX) 20 MG tablet, Take 1 tablet (20 mg total) by mouth 3 (three) times a week., Disp: 30 tablet, Rfl: 4    loratadine-pseudoephedrine  mg (CLARITIN-D 24 HOUR)  mg per 24 hr tablet, Take 1 tablet by mouth once daily. for 10 days, Disp: 10 tablet, Rfl: 0    methylPREDNISolone (MEDROL) 4 MG Tab, Take 1 tablet (4 mg total) by mouth once daily. for 10 days, Disp: 10 tablet, Rfl: 0    triamcinolone acetonide 0.1% (KENALOG) 0.1 % cream, Apply topically 2 (two) times daily. for 7 days, Disp: 1 g, Rfl: 0    Review of patient's allergies indicates:  No Known Allergies    No past medical history on file.    No past surgical history on file.    No family history on file.    Social History     Tobacco Use    Smoking status: Every Day     Current packs/day: 0.50     Average packs/day: 0.5 packs/day for 40.0 years (20.0 ttl pk-yrs)     Types: Cigarettes    Smokeless tobacco: Never   Substance Use Topics    Alcohol use: Yes     Comment: 6/DRINKS    Drug use: Not  "Currently     Types: Marijuana       Review of Systems   Constitutional:  Negative for chills and fever.   Eyes:  Negative for discharge and itching.   Respiratory:  Negative for chest tightness, shortness of breath and wheezing.    Cardiovascular:  Negative for chest pain.   Integumentary:  Positive for rash.   Allergic/Immunologic: Negative for food allergies.        Seasonal allergies   All other systems reviewed and are negative.        Objective:      Vitals:    09/22/23 1344   BP: 118/83   BP Location: Left arm   Patient Position: Sitting   BP Method: Medium (Automatic)   Pulse: 94   Temp: 98.3 °F (36.8 °C)   TempSrc: Oral   SpO2: 96%   Weight: 103.9 kg (229 lb)   Height: 5' 9" (1.753 m)     Physical Exam  Vitals reviewed.   Constitutional:       General: He is not in acute distress.     Appearance: Normal appearance.   HENT:      Head: Normocephalic and atraumatic.   Cardiovascular:      Rate and Rhythm: Normal rate and regular rhythm.   Pulmonary:      Effort: Pulmonary effort is normal.      Breath sounds: Normal breath sounds.   Skin:     General: Skin is warm and dry.      Findings: Rash present. Rash is crusting and scaling. Rash is not macular, nodular, papular, purpuric, urticarial or vesicular.             Comments: Bilateral heel rash appears flaky with wounds in various stages of healing, no appreciable oozing or drainage. See media (9/22)   Neurological:      Mental Status: He is alert.       Lab Results   Component Value Date    WBC 8.71 05/05/2023    HGB 16.7 05/05/2023    HCT 49.0 05/05/2023     05/05/2023    CHOL 203 (H) 09/14/2023    TRIG 151 (H) 09/14/2023    HDL 45 09/14/2023    ALT 42 05/05/2023    AST 22 05/05/2023     05/05/2023    K 4.4 05/05/2023     05/05/2023    CREATININE 1.22 05/05/2023    BUN 29 (H) 05/05/2023    CO2 25 05/05/2023    INR 1.07 05/05/2023    HGBA1C 5.2 09/14/2023      Assessment:       1. Rash, skin    2. Cellulitis of left lower extremity    3. " Left leg swelling    4. Colon cancer screening declined    5. Lung cancer screening declined by patient    6. Contact dermatitis, unspecified contact dermatitis type, unspecified trigger        Plan:         Problem List Items Addressed This Visit          Derm    Rash, skin     Upper extremity rash follow exposure to new soap. Treated with claritin otc prn.          Contact dermatitis     Patient presenting with cellulitis/rash that has previously been treated with Bactrim and Kenalog. Responds to treatment without complete resolution. Endorses two prior episodes following working in the yard. Notes he wears the same closes each time. Noted to be on both right and left heel and left wrist. See media. Possibly contact dermatitis secondary to poison ivy or some other irritant.     - Steroids IM, patient declined  - Medrol 4mg po qd x 5-7 days  - Claritin otc prn for itching  - F/U in 3 days to assess response             ID    Cellulitis of left lower extremity     Hx of similar symptom 5 month ago. Resolved with Bactrim and Kenalog topical. Today has returned with similar cellulitis on right and left ankle, as well as, left wrist.   - Kenalog 0.1% topical bid for next 2-4 weeks  - Claritin otc prn for itching   - Lasix 20 mg po 3/wk  - F/U as needed            Orthopedic    Left leg swelling     H/O DVT on 5/523 treated with Eliquis. Currently on Lasix 20 mg po 3/wk  - Continue lasix as prescribed            Palliative Care    Lung cancer screening declined by patient    Colon cancer screening declined     Patient declined colonoscopy at this time.   - Continue education              Follow up in about 3 days (around 9/25/2023).    Keturah Cormier MD

## 2023-09-26 NOTE — PROGRESS NOTES
I have reviewed the notes, assessments, and/or procedures performed by Dr. Cormier, I concur with his documentation of Jerome Miranda. Suspect contact dermatitis from poison ivy or oak or sumac based on the distribution of the rashes in a .

## 2023-10-20 ENCOUNTER — OFFICE VISIT (OUTPATIENT)
Dept: FAMILY MEDICINE | Facility: CLINIC | Age: 68
End: 2023-10-20
Payer: COMMERCIAL

## 2023-10-20 VITALS
DIASTOLIC BLOOD PRESSURE: 86 MMHG | SYSTOLIC BLOOD PRESSURE: 124 MMHG | OXYGEN SATURATION: 96 % | TEMPERATURE: 98 F | WEIGHT: 229 LBS | BODY MASS INDEX: 33.92 KG/M2 | HEIGHT: 69 IN | HEART RATE: 95 BPM

## 2023-10-20 DIAGNOSIS — I87.2 VENOUS STASIS DERMATITIS, UNSPECIFIED LATERALITY: Primary | ICD-10-CM

## 2023-10-20 DIAGNOSIS — I87.2 VENOUS STASIS DERMATITIS OF BOTH LOWER EXTREMITIES: ICD-10-CM

## 2023-10-20 PROCEDURE — 3079F DIAST BP 80-89 MM HG: CPT | Mod: ,,, | Performed by: FAMILY MEDICINE

## 2023-10-20 PROCEDURE — 3008F PR BODY MASS INDEX (BMI) DOCUMENTED: ICD-10-PCS | Mod: ,,, | Performed by: FAMILY MEDICINE

## 2023-10-20 PROCEDURE — 3008F BODY MASS INDEX DOCD: CPT | Mod: ,,, | Performed by: FAMILY MEDICINE

## 2023-10-20 PROCEDURE — 1101F PT FALLS ASSESS-DOCD LE1/YR: CPT | Mod: ,,, | Performed by: FAMILY MEDICINE

## 2023-10-20 PROCEDURE — 3074F SYST BP LT 130 MM HG: CPT | Mod: ,,, | Performed by: FAMILY MEDICINE

## 2023-10-20 PROCEDURE — 3288F PR FALLS RISK ASSESSMENT DOCUMENTED: ICD-10-PCS | Mod: ,,, | Performed by: FAMILY MEDICINE

## 2023-10-20 PROCEDURE — 3044F PR MOST RECENT HEMOGLOBIN A1C LEVEL <7.0%: ICD-10-PCS | Mod: ,,, | Performed by: FAMILY MEDICINE

## 2023-10-20 PROCEDURE — 3074F PR MOST RECENT SYSTOLIC BLOOD PRESSURE < 130 MM HG: ICD-10-PCS | Mod: ,,, | Performed by: FAMILY MEDICINE

## 2023-10-20 PROCEDURE — 99213 PR OFFICE/OUTPT VISIT, EST, LEVL III, 20-29 MIN: ICD-10-PCS | Mod: ,,, | Performed by: FAMILY MEDICINE

## 2023-10-20 PROCEDURE — 3044F HG A1C LEVEL LT 7.0%: CPT | Mod: ,,, | Performed by: FAMILY MEDICINE

## 2023-10-20 PROCEDURE — 99213 OFFICE O/P EST LOW 20 MIN: CPT | Mod: ,,, | Performed by: FAMILY MEDICINE

## 2023-10-20 PROCEDURE — 3288F FALL RISK ASSESSMENT DOCD: CPT | Mod: ,,, | Performed by: FAMILY MEDICINE

## 2023-10-20 PROCEDURE — 3079F PR MOST RECENT DIASTOLIC BLOOD PRESSURE 80-89 MM HG: ICD-10-PCS | Mod: ,,, | Performed by: FAMILY MEDICINE

## 2023-10-20 PROCEDURE — 1101F PR PT FALLS ASSESS DOC 0-1 FALLS W/OUT INJ PAST YR: ICD-10-PCS | Mod: ,,, | Performed by: FAMILY MEDICINE

## 2023-10-20 NOTE — PROGRESS NOTES
Amanda Meier MD MPH  [address], [place] [address]  Phone: [phone]     Subjective     Name: Jerome Miranda   YOB: 1955 (68 y.o.)  MRN: [id number]  Visit Date: 10/20/2023   Chief Complaint: Rash        HISTORY OF PRESENT ILLNESS:  Mr. Miranda is a 69 yo male with a PMH of LLE cellulitis, bilateral LE swelling on Lasix and DVT presents for a reoccurring rash.  He states that the rash has now progressed from his lower legs and feet, to his upper and lower arms. He also states that both of his palms have began to flake. He states the rash does not itch often, only when he thinks about it. This is the patient's 4th time being seen for this rash. He was last seen in office a month ago where he was  prescribed Kenalog 0.1,  lasix 20 mg,  and Medrol. He states the kenalog and medrol improved his rash temporarily, however 2 days ago it returns. He states that he avoided yard work and wooded areas since his last visit, however the rash still returned. He started to use coconut oil on his arms and states that the rash improved some, but it did not have an effect on his legs.He denies any new lotions, soaps, clothing, detergent, or fabrics. He denies any new foods or drinks. He states that he has not made any changes to his daily regimen besides limiting his outside exposure.    He was prescribed lasix 20mg last visit for the swelling in his lower extremity, however he states that he doesn't think it helped. His swelling does not bother him but he states that he notices it occurs more in the afternoon when he has been walking more.           PAST MEDICAL HISTORY:  Significant Diagnoses - Patient  has no past medical history on file.  Medications - Patient has a current medication list which includes the following long-term medication(s): furosemide and triamcinolone acetonide 0.1%.   Allergies - Patient has No Known Allergies.  Surgeries - Patient  has no past surgical history on file.  Family  "History - Patient family history is not on file.      SOCIAL HISTORY:  Tobacco - Patient  reports that he has been smoking cigarettes. He has a 20.0 pack-year smoking history. He has never used smokeless tobacco.   Alcohol - Patient  reports current alcohol use.   Recreational Drugs - Patient  reports that he does not currently use drugs after having used the following drugs: Marijuana.       Review of Systems   Constitutional:  Negative for fever.   Respiratory:  Negative for cough and shortness of breath.    Cardiovascular:  Positive for leg swelling. Negative for chest pain and palpitations.   Gastrointestinal:  Negative for abdominal pain, diarrhea, nausea and vomiting.   Genitourinary:  Negative for difficulty urinating and dysuria.   Integumentary:  Positive for rash. Negative for wound.   Allergic/Immunologic: Negative for environmental allergies and food allergies.          No past medical history on file.     Review of patient's allergies indicates:  No Known Allergies     No past surgical history on file.     No family history on file.    Current Outpatient Medications   Medication Instructions    furosemide (LASIX) 20 mg, Oral, Three times weekly    triamcinolone acetonide 0.1% (KENALOG) 0.1 % cream Topical (Top), 2 times daily        Objective     /86 (BP Location: Left arm, Patient Position: Sitting, BP Method: Medium (Manual))   Pulse 95   Temp 98.2 °F (36.8 °C) (Oral)   Ht 5' 9" (1.753 m)   Wt 103.9 kg (229 lb)   SpO2 96%   BMI 33.82 kg/m²     Physical Exam  Constitutional:       Appearance: Normal appearance. He is obese.   HENT:      Head: Normocephalic and atraumatic.   Eyes:      Pupils: Pupils are equal, round, and reactive to light.   Cardiovascular:      Rate and Rhythm: Normal rate and regular rhythm.   Pulmonary:      Effort: Pulmonary effort is normal.      Breath sounds: Normal breath sounds.   Musculoskeletal:      Right lower leg: Edema present.      Left lower leg: Edema " present.   Skin:     General: Skin is warm and moist.      Findings: Rash present. Rash is papular and scaling.             Comments: Large scaly rash covering entirety of left ans right heel. Present on dorsum of right foot. Medial aspect of left foot.     Scaly/ papular rash on posterior forearm bilaterally.   Peeling of palms bilaterally.   Neurological:      General: No focal deficit present.      Mental Status: He is alert.   Psychiatric:         Mood and Affect: Mood normal.         Behavior: Behavior normal.         Thought Content: Thought content normal.         Judgment: Judgment normal.          All recently obtained labs have been reviewed and discussed in detail with the patient.   Assessment     1. Venous stasis dermatitis, unspecified laterality    2. Venous stasis dermatitis of both lower extremities         Plan     Problem List Items Addressed This Visit          Derm    Rash, skin - Primary       Cardiac/Vascular    Venous stasis dermatitis of both lower extremities     - referral to dermatology   - counseling on foot hygiene   - suggested OTC Amlactin lotion              All orders:  Orders Placed This Encounter    Ambulatory referral/consult to Dermatology          No follow-ups on file.    Amanda Meier MD MPH   [employer]

## 2023-10-27 PROBLEM — E66.9 OBESITY (BMI 30.0-34.9): Status: ACTIVE | Noted: 2023-10-27

## 2023-10-27 PROBLEM — Z13.1 DIABETES MELLITUS SCREENING: Status: ACTIVE | Noted: 2023-10-27

## 2023-10-27 PROBLEM — Z53.20 COLONOSCOPY REFUSED: Status: ACTIVE | Noted: 2023-10-27

## 2023-10-27 PROBLEM — Z11.59 NEED FOR HEPATITIS C SCREENING TEST: Status: ACTIVE | Noted: 2023-10-27

## 2023-10-27 PROBLEM — Z13.220 SCREENING CHOLESTEROL LEVEL: Status: ACTIVE | Noted: 2023-10-27

## 2023-10-27 PROBLEM — Z53.20 COLONOSCOPY REFUSED: Status: RESOLVED | Noted: 2023-10-27 | Resolved: 2023-10-27

## 2023-10-28 NOTE — ASSESSMENT & PLAN NOTE
Body mass index is 33.82 kg/m². Morbid obesity complicates all aspects of disease management from diagnostic modalities to treatment. Weight loss encouraged and health benefits explained to patient.     Lipid panel and A1c ordered

## 2023-10-28 NOTE — ASSESSMENT & PLAN NOTE
The 10-year ASCVD risk score (Ike TERRY, et al., 2019) is: 21%    Values used to calculate the score:      Age: 68 years      Sex: Male      Is Non- : No      Diabetic: No      Tobacco smoker: Yes      Systolic Blood Pressure: 124 mmHg      Is BP treated: No      HDL Cholesterol: 45 mg/dL      Total Cholesterol: 203 mg/dL     - Lipid panel ordered, see above. , 10yr ASCVD risk 21%. Need to start patient on Lipitor PO 40 to 80 mg once daily to reduce LDL-C by ?50% and to prevent/lower risk of ASCVD if he agrees on next visit if not sooner.

## 2023-10-28 NOTE — ASSESSMENT & PLAN NOTE
- Improved after Bactrim, kenalog, lasix from last visit (see media photos today)  - f/u as needed

## 2023-10-28 NOTE — PROGRESS NOTES
Subjective:       Patient ID: Jerome Miranda is a 68 y.o. male.    Chief Complaint: Follow-up, Recurrent Skin Infections, and Hyperlipidemia    Patient is a 67yo chronic smoker with a history of LLE cellulitis, swelling, and DVT who presents to [facility] with left foot and ankle cellulitis 1-month followup. Patient was treated with Bactrim, Kenalog, Lasix, and Claritin as needed. He states that leg ulcers and wounds have healed although there are still some swelling and redness present. He feels that symptoms have improved. Denies f/c/cp/sob/n/v, appetite, urinary or bowel habit changes. He was diagnosed with DVT on the same left leg but finished the 3-month course of Eliquis. He reports his brother having a history of MI in the past but could be related to his cocaine use. A1c, hep C, and lipid panel were drawn today. He refused colonoscopy and AAA screen.          Current Outpatient Medications:     furosemide (LASIX) 20 MG tablet, Take 1 tablet (20 mg total) by mouth 3 (three) times a week., Disp: 30 tablet, Rfl: 4    Review of patient's allergies indicates:  No Known Allergies    History reviewed. No pertinent past medical history.    History reviewed. No pertinent surgical history.    History reviewed. No pertinent family history.    Social History     Tobacco Use    Smoking status: Every Day     Current packs/day: 0.50     Average packs/day: 0.5 packs/day for 40.0 years (20.0 ttl pk-yrs)     Types: Cigarettes    Smokeless tobacco: Never   Substance Use Topics    Alcohol use: Yes     Comment: 6/DRINKS    Drug use: Not Currently     Types: Marijuana       Review of Systems   Constitutional:  Negative for appetite change, chills, diaphoresis and fever.   HENT:  Negative for trouble swallowing.    Eyes:  Negative for visual disturbance.   Respiratory:  Negative for shortness of breath.    Cardiovascular:  Positive for leg swelling. Negative for chest pain.   Gastrointestinal:  Negative for abdominal pain, blood in  "stool, nausea and vomiting.   Genitourinary:  Negative for difficulty urinating and dysuria.   Musculoskeletal:  Negative for gait problem.   Integumentary:  Positive for color change.   Neurological:  Negative for numbness.   Psychiatric/Behavioral:  Negative for sleep disturbance.          Current Medications:   Medication List with Changes/Refills   Current Medications    FUROSEMIDE (LASIX) 20 MG TABLET    Take 1 tablet (20 mg total) by mouth 3 (three) times a week.       Start Date: 8/11/2023 End Date: 8/10/2024    TRIAMCINOLONE ACETONIDE 0.1% (KENALOG) 0.1 % CREAM    Apply topically 2 (two) times daily. Apply twice daily until symptoms subside (2-4 weeks), avoid open wounds.       Start Date: 8/10/2023 End Date: 9/9/2023            Objective:        Vitals:    09/14/23 1400   BP: 129/80   BP Location: Right arm   Patient Position: Sitting   BP Method: Medium (Automatic)   Pulse: 79   Temp: 98.1 °F (36.7 °C)   TempSrc: Temporal   SpO2: 96%   Weight: 103.9 kg (229 lb)   Height: 5' 9" (1.753 m)       Physical Exam  Vitals and nursing note reviewed.   Constitutional:       General: He is not in acute distress.     Appearance: He is obese. He is not toxic-appearing or diaphoretic.   HENT:      Head: Normocephalic and atraumatic.      Right Ear: External ear normal.      Left Ear: External ear normal.      Mouth/Throat:      Pharynx: Oropharynx is clear.   Eyes:      General:         Right eye: No discharge.         Left eye: No discharge.      Extraocular Movements: Extraocular movements intact.      Pupils: Pupils are equal, round, and reactive to light.   Cardiovascular:      Rate and Rhythm: Normal rate and regular rhythm.      Pulses: Normal pulses.      Heart sounds: Normal heart sounds. No murmur heard.  Pulmonary:      Effort: Pulmonary effort is normal. No respiratory distress.      Breath sounds: Normal breath sounds. No wheezing.   Abdominal:      General: There is no distension.   Musculoskeletal:         " General: Swelling present. No deformity.      Cervical back: Neck supple.      Right lower leg: No edema.      Left lower leg: Edema (with redness) present.   Skin:     General: Skin is warm and dry.      Findings: No lesion or rash.   Neurological:      Mental Status: He is alert.      Sensory: No sensory deficit.      Gait: Gait normal.   Psychiatric:         Mood and Affect: Mood normal.         Behavior: Behavior normal.               Lab Results   Component Value Date    WBC 8.71 05/05/2023    HGB 16.7 05/05/2023    HCT 49.0 05/05/2023     05/05/2023    CHOL 203 (H) 09/14/2023    TRIG 151 (H) 09/14/2023    HDL 45 09/14/2023    ALT 42 05/05/2023    AST 22 05/05/2023     05/05/2023    K 4.4 05/05/2023     05/05/2023    CREATININE 1.22 05/05/2023    BUN 29 (H) 05/05/2023    CO2 25 05/05/2023    INR 1.07 05/05/2023    HGBA1C 5.2 09/14/2023      Assessment:       1. Left leg swelling    2. Screening due    3. Class 1 obesity with body mass index (BMI) of 33.0 to 33.9 in adult, unspecified obesity type, unspecified whether serious comorbidity present    4. Cellulitis of left lower extremity    5. No vaccination-pt refuse    6. Screening for AAA (abdominal aortic aneurysm)    7. Colon cancer screening declined    8. Declined smoking cessation    9. Need for hepatitis C screening test    10. Diabetes mellitus screening    11. Screening cholesterol level    12. Obesity (BMI 30.0-34.9)        Plan:         Problem List Items Addressed This Visit          Cardiac/Vascular    Screening cholesterol level     The 10-year ASCVD risk score (Ike TERRY, et al., 2019) is: 21%    Values used to calculate the score:      Age: 68 years      Sex: Male      Is Non- : No      Diabetic: No      Tobacco smoker: Yes      Systolic Blood Pressure: 124 mmHg      Is BP treated: No      HDL Cholesterol: 45 mg/dL      Total Cholesterol: 203 mg/dL     - Lipid panel ordered, see above. , 10yr  ASCVD risk 21%. Need to start patient on Lipitor PO 40 to 80 mg once daily to reduce LDL-C by ?50% and to prevent/lower risk of ASCVD if he agrees on next visit if not sooner.         Screening for AAA (abdominal aortic aneurysm)     - Discussed risks/benefits, refused today.  - followup next visit            ID    No vaccination-pt refuse (Chronic)     Refused flu vaccine         Cellulitis of left lower extremity     - Improved after Bactrim, kenalog, lasix from last visit (see media photos today)  - f/u as needed          Need for hepatitis C screening test     Hep C screen ordered and negative            Endocrine    Diabetes mellitus screening     A1c ordered and 5.2% WNL         Obesity (BMI 30.0-34.9)     Body mass index is 33.82 kg/m². Morbid obesity complicates all aspects of disease management from diagnostic modalities to treatment. Weight loss encouraged and health benefits explained to patient.     Lipid panel and A1c ordered            Orthopedic    Left leg swelling - Primary     - likely component of PVD from chronic smoking (0.25 ppd for 30 years)  - Continue lasix 20 3x/week and Claritin prn. Elevate legs qhs. Consider vein clinic vs derm referral if worsen on next visit.              Palliative Care    Colon cancer screening declined     Discussed risks/benefits, refused colonoscopy screen today.            Other    Declined smoking cessation (Chronic)     Smokes 0.25-0.5 ppd for 40 years  Assistance with smoking cessation was offered, including:  []  Medications  [x]  Counseling  [x]  Printed Information on Smoking Cessation  [x]  Referral to a Smoking Cessation Program    Patient was counseled regarding smoking for 3-10 minutes.    Risks/benefits discussed. Refused cessation program          Other Visit Diagnoses       Screening due        Relevant Orders    Hepatitis C Antibody (Completed)    Lipid Panel (Completed)    Hemoglobin A1C (Completed)    Class 1 obesity with body mass index (BMI) of  33.0 to 33.9 in adult, unspecified obesity type, unspecified whether serious comorbidity present        Relevant Orders    Lipid Panel (Completed)    Hemoglobin A1C (Completed)              Follow up in about 6 months (around 3/14/2024), or if symptoms worsen or fail to improve.    Rolf Danielle DO     Instructed patient that if symptoms fail to improve or worsen patient should seek immediate medical attention or report to the nearest emergency department. Patient expressed verbal agreement and understanding to this plan of care.

## 2023-10-28 NOTE — ASSESSMENT & PLAN NOTE
- likely component of PVD from chronic smoking (0.25 ppd for 30 years)  - Continue lasix 20 3x/week and Claritin prn. Elevate legs qhs. Consider vein clinic vs derm referral if worsen on next visit.

## 2023-11-09 DIAGNOSIS — Z71.89 COMPLEX CARE COORDINATION: ICD-10-CM

## 2023-11-18 ENCOUNTER — PATIENT MESSAGE (OUTPATIENT)
Dept: ADMINISTRATIVE | Facility: HOSPITAL | Age: 68
End: 2023-11-18

## 2023-11-19 DIAGNOSIS — Z12.11 SCREENING FOR COLON CANCER: ICD-10-CM

## 2024-01-29 PROBLEM — Z13.6 SCREENING FOR AAA (ABDOMINAL AORTIC ANEURYSM): Status: RESOLVED | Noted: 2023-08-10 | Resolved: 2024-01-29

## 2024-01-29 PROBLEM — Z13.1 DIABETES MELLITUS SCREENING: Status: RESOLVED | Noted: 2023-10-27 | Resolved: 2024-01-29

## 2024-01-29 PROBLEM — Z13.220 SCREENING CHOLESTEROL LEVEL: Status: RESOLVED | Noted: 2023-10-27 | Resolved: 2024-01-29

## 2024-06-09 DIAGNOSIS — Z71.89 COMPLEX CARE COORDINATION: ICD-10-CM

## 2024-06-17 ENCOUNTER — OFFICE VISIT (OUTPATIENT)
Dept: FAMILY MEDICINE | Facility: CLINIC | Age: 69
End: 2024-06-17
Payer: COMMERCIAL

## 2024-06-17 VITALS
OXYGEN SATURATION: 94 % | WEIGHT: 227 LBS | HEART RATE: 97 BPM | BODY MASS INDEX: 33.62 KG/M2 | RESPIRATION RATE: 18 BRPM | SYSTOLIC BLOOD PRESSURE: 109 MMHG | HEIGHT: 69 IN | DIASTOLIC BLOOD PRESSURE: 74 MMHG

## 2024-06-17 DIAGNOSIS — M79.89 LEFT LEG SWELLING: ICD-10-CM

## 2024-06-17 DIAGNOSIS — H93.13 TINNITUS OF BOTH EARS: ICD-10-CM

## 2024-06-17 DIAGNOSIS — I87.2 VENOUS STASIS DERMATITIS OF BOTH LOWER EXTREMITIES: ICD-10-CM

## 2024-06-17 DIAGNOSIS — Z13.220 LIPID SCREENING: ICD-10-CM

## 2024-06-17 DIAGNOSIS — J30.9 ALLERGIC RHINITIS, UNSPECIFIED SEASONALITY, UNSPECIFIED TRIGGER: ICD-10-CM

## 2024-06-17 DIAGNOSIS — Z00.00 ANNUAL PHYSICAL EXAM: Primary | ICD-10-CM

## 2024-06-17 LAB
ALBUMIN SERPL BCP-MCNC: 3.5 G/DL (ref 3.5–5)
ALBUMIN/GLOB SERPL: 0.9 {RATIO}
ALP SERPL-CCNC: 78 U/L (ref 45–115)
ALT SERPL W P-5'-P-CCNC: 36 U/L (ref 16–61)
ANION GAP SERPL CALCULATED.3IONS-SCNC: 12 MMOL/L (ref 7–16)
AST SERPL W P-5'-P-CCNC: 15 U/L (ref 15–37)
BASOPHILS # BLD AUTO: 0.07 K/UL (ref 0–0.2)
BASOPHILS NFR BLD AUTO: 0.7 % (ref 0–1)
BILIRUB SERPL-MCNC: 0.3 MG/DL (ref ?–1.2)
BUN SERPL-MCNC: 23 MG/DL (ref 7–18)
BUN/CREAT SERPL: 17 (ref 6–20)
CALCIUM SERPL-MCNC: 9.6 MG/DL (ref 8.5–10.1)
CHLORIDE SERPL-SCNC: 105 MMOL/L (ref 98–107)
CO2 SERPL-SCNC: 32 MMOL/L (ref 21–32)
CREAT SERPL-MCNC: 1.36 MG/DL (ref 0.7–1.3)
DIFFERENTIAL METHOD BLD: ABNORMAL
EGFR (NO RACE VARIABLE) (RUSH/TITUS): 56 ML/MIN/1.73M2
EOSINOPHIL # BLD AUTO: 0.39 K/UL (ref 0–0.5)
EOSINOPHIL NFR BLD AUTO: 3.9 % (ref 1–4)
ERYTHROCYTE [DISTWIDTH] IN BLOOD BY AUTOMATED COUNT: 14.3 % (ref 11.5–14.5)
GLOBULIN SER-MCNC: 3.7 G/DL (ref 2–4)
GLUCOSE SERPL-MCNC: 80 MG/DL (ref 74–106)
HCT VFR BLD AUTO: 50.9 % (ref 40–54)
HGB BLD-MCNC: 17.1 G/DL (ref 13.5–18)
IMM GRANULOCYTES # BLD AUTO: 0.04 K/UL (ref 0–0.04)
IMM GRANULOCYTES NFR BLD: 0.4 % (ref 0–0.4)
LYMPHOCYTES # BLD AUTO: 2.07 K/UL (ref 1–4.8)
LYMPHOCYTES NFR BLD AUTO: 20.5 % (ref 27–41)
MCH RBC QN AUTO: 33.9 PG (ref 27–31)
MCHC RBC AUTO-ENTMCNC: 33.6 G/DL (ref 32–36)
MCV RBC AUTO: 101 FL (ref 80–96)
MONOCYTES # BLD AUTO: 1.01 K/UL (ref 0–0.8)
MONOCYTES NFR BLD AUTO: 10 % (ref 2–6)
MPC BLD CALC-MCNC: 9.9 FL (ref 9.4–12.4)
NEUTROPHILS # BLD AUTO: 6.53 K/UL (ref 1.8–7.7)
NEUTROPHILS NFR BLD AUTO: 64.5 % (ref 53–65)
NRBC # BLD AUTO: 0 X10E3/UL
NRBC, AUTO (.00): 0 %
PLATELET # BLD AUTO: 235 K/UL (ref 150–400)
POTASSIUM SERPL-SCNC: 4.8 MMOL/L (ref 3.5–5.1)
PROT SERPL-MCNC: 7.2 G/DL (ref 6.4–8.2)
RBC # BLD AUTO: 5.04 M/UL (ref 4.6–6.2)
SODIUM SERPL-SCNC: 144 MMOL/L (ref 136–145)
WBC # BLD AUTO: 10.11 K/UL (ref 4.5–11)

## 2024-06-17 PROCEDURE — 1159F MED LIST DOCD IN RCRD: CPT | Mod: ,,, | Performed by: SPECIALIST

## 2024-06-17 PROCEDURE — 80053 COMPREHEN METABOLIC PANEL: CPT | Mod: ,,, | Performed by: CLINICAL MEDICAL LABORATORY

## 2024-06-17 PROCEDURE — 3078F DIAST BP <80 MM HG: CPT | Mod: ,,, | Performed by: SPECIALIST

## 2024-06-17 PROCEDURE — 3074F SYST BP LT 130 MM HG: CPT | Mod: ,,, | Performed by: SPECIALIST

## 2024-06-17 PROCEDURE — 99214 OFFICE O/P EST MOD 30 MIN: CPT | Mod: GC,,, | Performed by: SPECIALIST

## 2024-06-17 PROCEDURE — 3008F BODY MASS INDEX DOCD: CPT | Mod: ,,, | Performed by: SPECIALIST

## 2024-06-17 PROCEDURE — 1160F RVW MEDS BY RX/DR IN RCRD: CPT | Mod: ,,, | Performed by: SPECIALIST

## 2024-06-17 PROCEDURE — 85025 COMPLETE CBC W/AUTO DIFF WBC: CPT | Mod: ,,, | Performed by: CLINICAL MEDICAL LABORATORY

## 2024-06-17 PROCEDURE — 1126F AMNT PAIN NOTED NONE PRSNT: CPT | Mod: ,,, | Performed by: SPECIALIST

## 2024-06-17 PROCEDURE — G2211 COMPLEX E/M VISIT ADD ON: HCPCS | Mod: GC,,, | Performed by: SPECIALIST

## 2024-06-17 RX ORDER — CETIRIZINE HYDROCHLORIDE 10 MG/1
10 TABLET ORAL DAILY
Qty: 30 TABLET | Refills: 0 | Status: SHIPPED | OUTPATIENT
Start: 2024-06-17 | End: 2024-07-17

## 2024-06-17 RX ORDER — FLUTICASONE PROPIONATE 50 MCG
1 SPRAY, SUSPENSION (ML) NASAL DAILY
Qty: 9.9 ML | Refills: 0 | Status: SHIPPED | OUTPATIENT
Start: 2024-06-17

## 2024-06-17 NOTE — ASSESSMENT & PLAN NOTE
- Chronic for 20-30 years from previous work with high noises doing [employer]. No hearing loss.   - Referral to ENT was made (Dr Ghotra)  - follow up with PCP in 4 weeks

## 2024-06-17 NOTE — ASSESSMENT & PLAN NOTE
- monitor for worsening of symptoms and failing of symptoms' relief  - look out for allergens that make symptoms appear  - avoid exposure to allergens as much as possible  - zyrtec 10 mg QD  - Flonase nasal spray 1-2 times per day  - follow up in a month

## 2024-06-17 NOTE — ASSESSMENT & PLAN NOTE
- patient's current co morbidities were discussed in details.  - pertinent labs were ordered  - follow up in 4 weeks

## 2024-06-17 NOTE — ASSESSMENT & PLAN NOTE
- patient endorsed pedal edema in his left lower extremity. Worse at the end of the day.  - there was no calf tenderness reported nor were there any skin changes  - his recent doppler u/s of the left lower extremities didn't reveal any clots or venous insufficiency  - patient was counseled about importance of elevating legs  - Anuj's knee high stocking of 12-18 mm hg were recommended to be worn during the day and take off during sleeping.  - follow up with the PCP in 4 weeks

## 2024-06-17 NOTE — PROGRESS NOTES
Amanda eMier MD MPH  [address], [place] [address]  Phone: [phone]     Subjective     Name: Jerome Miranda   YOB: 1955 (68 y.o.)  MRN: [id number]  Visit Date: 6/17/2024   Chief Complaint: Leg Swelling (Room 4 annual exam, c/o left lower leg swelling for months, also patient has never had a colonoscope)        HISTORY OF PRESENT ILLNESS:  Patient is a 69 yo male who came to the clinic for annual visit. He is doing fairly good. He didn't have any complaints at the moment. He didn't appear in any acute distress. He complained of bilateral tinnitus and hearing loss. He denied ear pain, loss of balance, or discharge from ears. He also complained of symptoms of seasonal allergies.  Patient denied any SOB, wheezing, fevers, gastrointestinal symptoms, genitourinary symptoms, or myalgias.            PAST MEDICAL HISTORY:  Significant Diagnoses - Patient  has no past medical history on file.  Medications - Patient has a current medication list which includes the following long-term medication(s): cetirizine and furosemide.   Allergies - Patient has No Known Allergies.  Surgeries - Patient  has no past surgical history on file.  Family History - Patient family history includes Cancer in his father; Heart disease in his brother.      SOCIAL HISTORY:  Tobacco - Patient  reports that he has been smoking cigarettes. He has a 20 pack-year smoking history. He has never used smokeless tobacco.   Alcohol - Patient  reports current alcohol use.   Recreational Drugs - Patient  reports that he does not currently use drugs after having used the following drugs: Marijuana.       Review of Systems   Constitutional:  Negative for activity change, appetite change, chills, fatigue and fever.   HENT:  Positive for nasal congestion, hearing loss, rhinorrhea, sneezing and tinnitus. Negative for ear discharge, ear pain, postnasal drip, sinus pressure/congestion and sore throat.    Eyes:  Positive for discharge and  "itching. Negative for visual disturbance.   Respiratory:  Negative for cough, choking, chest tightness, shortness of breath and wheezing.    Cardiovascular:  Negative for chest pain, palpitations, leg swelling and claudication.   Gastrointestinal:  Negative for abdominal distention, abdominal pain, constipation, diarrhea, nausea, vomiting and reflux.   Genitourinary:  Negative for difficulty urinating, dysuria, frequency, hematuria and urgency.   Musculoskeletal:  Negative for arthralgias, joint swelling and myalgias.   Integumentary:  Negative for rash.   Neurological:  Negative for tremors, light-headedness, numbness and headaches.          History reviewed. No pertinent past medical history.     Review of patient's allergies indicates:  No Known Allergies     History reviewed. No pertinent surgical history.     Family History   Problem Relation Name Age of Onset    Cancer Father      Heart disease Brother         Current Outpatient Medications   Medication Instructions    cetirizine (ZYRTEC) 10 mg, Oral, Daily    fluticasone propionate (FLONASE) 50 mcg, Each Nostril, Daily    furosemide (LASIX) 20 mg, Oral, Three times weekly        Objective     /74 (BP Location: Left arm, Patient Position: Sitting, BP Method: Medium (Automatic))   Pulse 97   Resp 18   Ht 5' 9" (1.753 m)   Wt 103 kg (227 lb)   SpO2 (!) 94%   BMI 33.52 kg/m²     Physical Exam  Vitals and nursing note reviewed.   Constitutional:       Appearance: Normal appearance. He is normal weight.   HENT:      Head: Normocephalic and atraumatic.      Right Ear: Tympanic membrane and ear canal normal. There is impacted cerumen.      Left Ear: Tympanic membrane and ear canal normal.      Nose: Nose normal.      Mouth/Throat:      Mouth: Mucous membranes are moist.      Pharynx: Oropharynx is clear.   Eyes:      Extraocular Movements: Extraocular movements intact.      Conjunctiva/sclera: Conjunctivae normal.      Pupils: Pupils are equal, round, and " reactive to light.   Cardiovascular:      Rate and Rhythm: Normal rate and regular rhythm.      Pulses: Normal pulses.      Heart sounds: Normal heart sounds. No murmur heard.  Pulmonary:      Effort: Pulmonary effort is normal. No respiratory distress.      Breath sounds: Normal breath sounds. No wheezing.   Abdominal:      General: Bowel sounds are normal. There is no distension.      Palpations: Abdomen is soft.      Tenderness: There is no abdominal tenderness. There is no guarding.   Musculoskeletal:         General: No tenderness.      Cervical back: Normal range of motion and neck supple.      Left lower leg: Edema present.   Skin:     Capillary Refill: Capillary refill takes less than 2 seconds.   Neurological:      General: No focal deficit present.      Mental Status: He is alert and oriented to person, place, and time. Mental status is at baseline.   Psychiatric:         Mood and Affect: Mood normal.         Behavior: Behavior normal.         Thought Content: Thought content normal.         Judgment: Judgment normal.          All recently obtained labs have been reviewed and discussed in detail with the patient.   Assessment     1. Annual physical exam    2. Venous stasis dermatitis of both lower extremities    3. Left leg swelling    4. Tinnitus of both ears    5. Allergic rhinitis, unspecified seasonality, unspecified trigger    6. Lipid screening         Plan     Problem List Items Addressed This Visit          ENT    Tinnitus of both ears     - Chronic for 20-30 years from previous work with high noises doing [employer]. No hearing loss.   - Referral to ENT was made (Dr Ghotra)  - follow up with PCP in 4 weeks         Relevant Orders    Ambulatory referral/consult to ENT    Allergic rhinitis     - monitor for worsening of symptoms and failing of symptoms' relief  - look out for allergens that make symptoms appear  - avoid exposure to allergens as much as possible  - zyrtec 10 mg QD  - Flonase nasal spray  1-2 times per day  - follow up in a month           Relevant Medications    fluticasone propionate (FLONASE) 50 mcg/actuation nasal spray    cetirizine (ZYRTEC) 10 MG tablet       Cardiac/Vascular    Venous stasis dermatitis of both lower extremities     - patient endorsed pedal edema in his left lower extremity. Worse at the end of the day.  - there was no calf tenderness reported nor were there any skin changes  - his recent doppler u/s of the left lower extremities didn't reveal any clots or venous insufficiency  - patient was counseled about importance of elevating legs  - Anuj's knee high stocking of 12-18 mm hg were recommended to be worn during the day and take off during sleeping.  - follow up with the PCP in 4 weeks           Relevant Orders    CBC Auto Differential (Completed)    Comprehensive Metabolic Panel (Completed)       Orthopedic    Left leg swelling       Other    Annual physical exam - Primary     - patient's current co morbidities were discussed in details.  - pertinent labs were ordered  - follow up in 4 weeks          Other Visit Diagnoses       Lipid screening                  All orders:  Orders Placed This Encounter    CBC Auto Differential    Comprehensive Metabolic Panel    CBC with Differential    Ambulatory referral/consult to ENT    fluticasone propionate (FLONASE) 50 mcg/actuation nasal spray    cetirizine (ZYRTEC) 10 MG tablet          Follow up in about 4 weeks (around 7/15/2024) for with PCP.    Amanda Meier MD MPH   [facility]

## 2024-07-10 ENCOUNTER — CLINICAL SUPPORT (OUTPATIENT)
Dept: AUDIOLOGY | Facility: CLINIC | Age: 69
End: 2024-07-10
Payer: COMMERCIAL

## 2024-07-10 ENCOUNTER — OFFICE VISIT (OUTPATIENT)
Dept: OTOLARYNGOLOGY | Facility: CLINIC | Age: 69
End: 2024-07-10
Payer: COMMERCIAL

## 2024-07-10 VITALS — WEIGHT: 207 LBS | HEIGHT: 69 IN | BODY MASS INDEX: 30.66 KG/M2

## 2024-07-10 DIAGNOSIS — H90.3 SENSORINEURAL HEARING LOSS (SNHL) OF BOTH EARS: Primary | ICD-10-CM

## 2024-07-10 DIAGNOSIS — H91.93 BILATERAL HEARING LOSS, UNSPECIFIED HEARING LOSS TYPE: Primary | ICD-10-CM

## 2024-07-10 DIAGNOSIS — H93.13 TINNITUS OF BOTH EARS: ICD-10-CM

## 2024-07-10 PROCEDURE — 1159F MED LIST DOCD IN RCRD: CPT | Mod: CPTII,,, | Performed by: OTOLARYNGOLOGY

## 2024-07-10 PROCEDURE — 99999 PR PBB SHADOW E&M-EST. PATIENT-LVL III: CPT | Mod: PBBFAC,,, | Performed by: OTOLARYNGOLOGY

## 2024-07-10 PROCEDURE — 3008F BODY MASS INDEX DOCD: CPT | Mod: CPTII,,, | Performed by: OTOLARYNGOLOGY

## 2024-07-10 PROCEDURE — 99999 PR PBB SHADOW E&M-EST. PATIENT-LVL II: CPT | Mod: PBBFAC,,, | Performed by: AUDIOLOGIST

## 2024-07-10 PROCEDURE — 99204 OFFICE O/P NEW MOD 45 MIN: CPT | Mod: S$PBB,,, | Performed by: OTOLARYNGOLOGY

## 2024-07-10 PROCEDURE — 99213 OFFICE O/P EST LOW 20 MIN: CPT | Mod: PBBFAC | Performed by: OTOLARYNGOLOGY

## 2024-07-10 PROCEDURE — 99212 OFFICE O/P EST SF 10 MIN: CPT | Mod: PBBFAC | Performed by: AUDIOLOGIST

## 2024-07-10 NOTE — PROGRESS NOTES
Subjective:       Patient ID: Jerome Miranda is a 68 y.o. male.    Chief Complaint: Tinnitus (Patient complains of having ringing and buzzing in his ears for 30-35 years. States certain sounds drowns everything out.)    Ringing in Ears:    Associated symptoms: Tinnitus.      Review of Systems   HENT:  Positive for tinnitus.    All other systems reviewed and are negative.      Objective:      Physical Exam  General: NAD  Head: Normocephalic, atraumatic, no facial asymmetry/normal strength,  Ears: Both auricules normal in appearance, w/o deformities tympanic membranes dull  external auditory canals normal  Nose: External nose w/o deformities normal turbinates no drainage or inflammation  Oral Cavity: Lips, gums, floor of mouth, tongue hard palate, and buccal mucosa without mass/lesion  Oropharynx: Mucosa pink and moist, soft palate, posterior pharynx and oropharyngeal wall without mass/lesion  Neck: Supple, symmetric, trachea midline, no palpable mass/lesion, no palpable cervical lymphadenopathy  Skin: Warm and dry, no concerning lesions  Respiratory: Respirations even, unlabored  Assessment:       1. Sensorineural hearing loss (SNHL) of both ears    2. Tinnitus of both ears        Plan:       Audio reviewed and interpreted SNHL causing tinnitus     Rec hearing aids